# Patient Record
Sex: FEMALE | Race: ASIAN | NOT HISPANIC OR LATINO | URBAN - METROPOLITAN AREA
[De-identification: names, ages, dates, MRNs, and addresses within clinical notes are randomized per-mention and may not be internally consistent; named-entity substitution may affect disease eponyms.]

---

## 2022-09-11 ENCOUNTER — INPATIENT (INPATIENT)
Facility: HOSPITAL | Age: 37
LOS: 3 days | Discharge: HOME | End: 2022-09-15
Attending: OBSTETRICS & GYNECOLOGY | Admitting: OBSTETRICS & GYNECOLOGY
Payer: MEDICAID

## 2022-09-11 VITALS — HEIGHT: 59 IN | WEIGHT: 125 LBS

## 2022-09-11 DIAGNOSIS — O26.899 OTHER SPECIFIED PREGNANCY RELATED CONDITIONS, UNSPECIFIED TRIMESTER: ICD-10-CM

## 2022-09-11 DIAGNOSIS — Z34.80 ENCOUNTER FOR SUPERVISION OF OTHER NORMAL PREGNANCY, UNSPECIFIED TRIMESTER: ICD-10-CM

## 2022-09-11 DIAGNOSIS — Z3A.00 WEEKS OF GESTATION OF PREGNANCY NOT SPECIFIED: ICD-10-CM

## 2022-09-11 LAB
APTT BLD: 29.7 SEC — SIGNIFICANT CHANGE UP (ref 27.5–35.5)
BASOPHILS # BLD AUTO: 0.03 K/UL — SIGNIFICANT CHANGE UP (ref 0–0.2)
BASOPHILS NFR BLD AUTO: 0.4 % — SIGNIFICANT CHANGE UP (ref 0–2)
BLD GP AB SCN SERPL QL: SIGNIFICANT CHANGE UP
EOSINOPHIL # BLD AUTO: 0.1 K/UL — SIGNIFICANT CHANGE UP (ref 0–0.5)
EOSINOPHIL NFR BLD AUTO: 1.4 % — SIGNIFICANT CHANGE UP (ref 0–6)
FIBRINOGEN PPP-MCNC: 926 MG/DL — HIGH (ref 340–550)
GLUCOSE BLDC GLUCOMTR-MCNC: 130 MG/DL — HIGH (ref 70–99)
GLUCOSE BLDC GLUCOMTR-MCNC: 140 MG/DL — HIGH (ref 70–99)
GLUCOSE BLDC GLUCOMTR-MCNC: 99 MG/DL — SIGNIFICANT CHANGE UP (ref 70–99)
HCT VFR BLD CALC: 39.5 % — SIGNIFICANT CHANGE UP (ref 34.5–45)
HGB BLD-MCNC: 12.8 G/DL — SIGNIFICANT CHANGE UP (ref 11.5–15.5)
IMM GRANULOCYTES NFR BLD AUTO: 0.7 % — SIGNIFICANT CHANGE UP (ref 0–1.5)
INR BLD: 0.95 RATIO — SIGNIFICANT CHANGE UP (ref 0.88–1.16)
LYMPHOCYTES # BLD AUTO: 1.47 K/UL — SIGNIFICANT CHANGE UP (ref 1–3.3)
LYMPHOCYTES # BLD AUTO: 20.4 % — SIGNIFICANT CHANGE UP (ref 13–44)
MCHC RBC-ENTMCNC: 27.2 PG — SIGNIFICANT CHANGE UP (ref 27–34)
MCHC RBC-ENTMCNC: 32.4 GM/DL — SIGNIFICANT CHANGE UP (ref 32–36)
MCV RBC AUTO: 84 FL — SIGNIFICANT CHANGE UP (ref 80–100)
MONOCYTES # BLD AUTO: 0.48 K/UL — SIGNIFICANT CHANGE UP (ref 0–0.9)
MONOCYTES NFR BLD AUTO: 6.6 % — SIGNIFICANT CHANGE UP (ref 2–14)
NEUTROPHILS # BLD AUTO: 5.09 K/UL — SIGNIFICANT CHANGE UP (ref 1.8–7.4)
NEUTROPHILS NFR BLD AUTO: 70.5 % — SIGNIFICANT CHANGE UP (ref 43–77)
NRBC # BLD: 0 /100 WBCS — SIGNIFICANT CHANGE UP (ref 0–0)
PLATELET # BLD AUTO: 201 K/UL — SIGNIFICANT CHANGE UP (ref 150–400)
PROTHROM AB SERPL-ACNC: 11.3 SEC — SIGNIFICANT CHANGE UP (ref 10.5–13.4)
RBC # BLD: 4.7 M/UL — SIGNIFICANT CHANGE UP (ref 3.8–5.2)
RBC # FLD: 14.8 % — HIGH (ref 10.3–14.5)
SARS-COV-2 RNA SPEC QL NAA+PROBE: SIGNIFICANT CHANGE UP
WBC # BLD: 7.22 K/UL — SIGNIFICANT CHANGE UP (ref 3.8–10.5)
WBC # FLD AUTO: 7.22 K/UL — SIGNIFICANT CHANGE UP (ref 3.8–10.5)

## 2022-09-11 RX ORDER — CHLORHEXIDINE GLUCONATE 213 G/1000ML
1 SOLUTION TOPICAL ONCE
Refills: 0 | Status: DISCONTINUED | OUTPATIENT
Start: 2022-09-11 | End: 2022-09-13

## 2022-09-11 RX ORDER — SODIUM CHLORIDE 9 MG/ML
1000 INJECTION INTRAMUSCULAR; INTRAVENOUS; SUBCUTANEOUS
Refills: 0 | Status: DISCONTINUED | OUTPATIENT
Start: 2022-09-11 | End: 2022-09-13

## 2022-09-11 RX ORDER — SODIUM CHLORIDE 9 MG/ML
1000 INJECTION, SOLUTION INTRAVENOUS
Refills: 0 | Status: DISCONTINUED | OUTPATIENT
Start: 2022-09-11 | End: 2022-09-13

## 2022-09-11 RX ORDER — CITRIC ACID/SODIUM CITRATE 300-500 MG
15 SOLUTION, ORAL ORAL EVERY 6 HOURS
Refills: 0 | Status: DISCONTINUED | OUTPATIENT
Start: 2022-09-11 | End: 2022-09-13

## 2022-09-11 RX ORDER — LEVOTHYROXINE SODIUM 125 MCG
50 TABLET ORAL DAILY
Refills: 0 | Status: DISCONTINUED | OUTPATIENT
Start: 2022-09-11 | End: 2022-09-15

## 2022-09-11 RX ADMIN — SODIUM CHLORIDE 125 MILLILITER(S): 9 INJECTION, SOLUTION INTRAVENOUS at 17:25

## 2022-09-11 RX ADMIN — SODIUM CHLORIDE 125 MILLILITER(S): 9 INJECTION INTRAMUSCULAR; INTRAVENOUS; SUBCUTANEOUS at 17:10

## 2022-09-11 RX ADMIN — Medication 50 MICROGRAM(S): at 12:44

## 2022-09-11 NOTE — PATIENT PROFILE OB - FALL HARM RISK - UNIVERSAL INTERVENTIONS
Bed in lowest position, wheels locked, appropriate side rails in place/Call bell, personal items and telephone in reach/Instruct patient to call for assistance before getting out of bed or chair/Non-slip footwear when patient is out of bed/[place] to call system/Physically safe environment - no spills, clutter or unnecessary equipment/Purposeful Proactive Rounding/Room/bathroom lighting operational, light cord in reach

## 2022-09-11 NOTE — PATIENT PROFILE OB - TELEPHONIC ID NUMBER OF THE INTERPRETER
Pt here with posterior L foot pain x 2 weeks. Denies injury or trauma. 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 Declines WC   [id number]

## 2022-09-12 LAB
ABO RH CONFIRMATION: SIGNIFICANT CHANGE UP
GLUCOSE BLDC GLUCOMTR-MCNC: 106 MG/DL — HIGH (ref 70–99)
GLUCOSE BLDC GLUCOMTR-MCNC: 114 MG/DL — HIGH (ref 70–99)
GLUCOSE BLDC GLUCOMTR-MCNC: 142 MG/DL — HIGH (ref 70–99)
GLUCOSE BLDC GLUCOMTR-MCNC: 162 MG/DL — HIGH (ref 70–99)
GLUCOSE BLDC GLUCOMTR-MCNC: 87 MG/DL — SIGNIFICANT CHANGE UP (ref 70–99)
T PALLIDUM AB TITR SER: NEGATIVE — SIGNIFICANT CHANGE UP

## 2022-09-12 RX ORDER — OXYTOCIN 10 UNIT/ML
2 VIAL (ML) INJECTION
Qty: 30 | Refills: 0 | Status: DISCONTINUED | OUTPATIENT
Start: 2022-09-12 | End: 2022-09-15

## 2022-09-12 RX ORDER — CEFAZOLIN SODIUM 1 G
1000 VIAL (EA) INJECTION ONCE
Refills: 0 | Status: COMPLETED | OUTPATIENT
Start: 2022-09-12 | End: 2022-09-12

## 2022-09-12 RX ORDER — AZITHROMYCIN 500 MG/1
500 TABLET, FILM COATED ORAL ONCE
Refills: 0 | Status: COMPLETED | OUTPATIENT
Start: 2022-09-12 | End: 2022-09-13

## 2022-09-12 RX ORDER — CITRIC ACID/SODIUM CITRATE 300-500 MG
30 SOLUTION, ORAL ORAL ONCE
Refills: 0 | Status: COMPLETED | OUTPATIENT
Start: 2022-09-12 | End: 2022-09-12

## 2022-09-12 RX ADMIN — SODIUM CHLORIDE 125 MILLILITER(S): 9 INJECTION, SOLUTION INTRAVENOUS at 02:00

## 2022-09-12 RX ADMIN — Medication 50 MICROGRAM(S): at 07:11

## 2022-09-12 RX ADMIN — Medication 30 MILLILITER(S): at 23:40

## 2022-09-12 RX ADMIN — Medication 100 MILLIGRAM(S): at 23:45

## 2022-09-12 RX ADMIN — Medication 2 MILLIUNIT(S)/MIN: at 15:44

## 2022-09-12 RX ADMIN — SODIUM CHLORIDE 125 MILLILITER(S): 9 INJECTION INTRAMUSCULAR; INTRAVENOUS; SUBCUTANEOUS at 11:28

## 2022-09-13 LAB
GLUCOSE BLDC GLUCOMTR-MCNC: 101 MG/DL — HIGH (ref 70–99)
GLUCOSE BLDC GLUCOMTR-MCNC: 105 MG/DL — HIGH (ref 70–99)
GLUCOSE BLDC GLUCOMTR-MCNC: 107 MG/DL — HIGH (ref 70–99)
GLUCOSE BLDC GLUCOMTR-MCNC: 112 MG/DL — HIGH (ref 70–99)

## 2022-09-13 PROCEDURE — 88307 TISSUE EXAM BY PATHOLOGIST: CPT | Mod: 26

## 2022-09-13 PROCEDURE — 88304 TISSUE EXAM BY PATHOLOGIST: CPT | Mod: 26

## 2022-09-13 DEVICE — SURGICEL FIBRILLAR 2 X 4": Type: IMPLANTABLE DEVICE | Status: FUNCTIONAL

## 2022-09-13 RX ORDER — FERROUS SULFATE 325(65) MG
325 TABLET ORAL DAILY
Refills: 0 | Status: DISCONTINUED | OUTPATIENT
Start: 2022-09-13 | End: 2022-09-15

## 2022-09-13 RX ORDER — IBUPROFEN 200 MG
600 TABLET ORAL EVERY 6 HOURS
Refills: 0 | Status: COMPLETED | OUTPATIENT
Start: 2022-09-13 | End: 2023-08-12

## 2022-09-13 RX ORDER — ACETAMINOPHEN 500 MG
975 TABLET ORAL EVERY 6 HOURS
Refills: 0 | Status: DISCONTINUED | OUTPATIENT
Start: 2022-09-13 | End: 2022-09-15

## 2022-09-13 RX ORDER — SODIUM CHLORIDE 9 MG/ML
1000 INJECTION, SOLUTION INTRAVENOUS
Refills: 0 | Status: DISCONTINUED | OUTPATIENT
Start: 2022-09-13 | End: 2022-09-15

## 2022-09-13 RX ORDER — IBUPROFEN 200 MG
600 TABLET ORAL EVERY 6 HOURS
Refills: 0 | Status: DISCONTINUED | OUTPATIENT
Start: 2022-09-13 | End: 2022-09-15

## 2022-09-13 RX ORDER — ACETAMINOPHEN 500 MG
1000 TABLET ORAL ONCE
Refills: 0 | Status: COMPLETED | OUTPATIENT
Start: 2022-09-13 | End: 2022-09-14

## 2022-09-13 RX ORDER — MAGNESIUM HYDROXIDE 400 MG/1
30 TABLET, CHEWABLE ORAL
Refills: 0 | Status: DISCONTINUED | OUTPATIENT
Start: 2022-09-13 | End: 2022-09-15

## 2022-09-13 RX ORDER — TETANUS TOXOID, REDUCED DIPHTHERIA TOXOID AND ACELLULAR PERTUSSIS VACCINE, ADSORBED 5; 2.5; 8; 8; 2.5 [IU]/.5ML; [IU]/.5ML; UG/.5ML; UG/.5ML; UG/.5ML
0.5 SUSPENSION INTRAMUSCULAR ONCE
Refills: 0 | Status: DISCONTINUED | OUTPATIENT
Start: 2022-09-13 | End: 2022-09-15

## 2022-09-13 RX ORDER — INSULIN LISPRO 100/ML
VIAL (ML) SUBCUTANEOUS
Refills: 0 | Status: DISCONTINUED | OUTPATIENT
Start: 2022-09-13 | End: 2022-09-15

## 2022-09-13 RX ORDER — KETOROLAC TROMETHAMINE 30 MG/ML
30 SYRINGE (ML) INJECTION EVERY 6 HOURS
Refills: 0 | Status: DISCONTINUED | OUTPATIENT
Start: 2022-09-13 | End: 2022-09-13

## 2022-09-13 RX ORDER — HEPARIN SODIUM 5000 [USP'U]/ML
5000 INJECTION INTRAVENOUS; SUBCUTANEOUS EVERY 12 HOURS
Refills: 0 | Status: DISCONTINUED | OUTPATIENT
Start: 2022-09-13 | End: 2022-09-15

## 2022-09-13 RX ORDER — FOLIC ACID 0.8 MG
1 TABLET ORAL DAILY
Refills: 0 | Status: DISCONTINUED | OUTPATIENT
Start: 2022-09-13 | End: 2022-09-15

## 2022-09-13 RX ORDER — DEXTROSE 50 % IN WATER 50 %
12.5 SYRINGE (ML) INTRAVENOUS ONCE
Refills: 0 | Status: DISCONTINUED | OUTPATIENT
Start: 2022-09-13 | End: 2022-09-15

## 2022-09-13 RX ORDER — SIMETHICONE 80 MG/1
80 TABLET, CHEWABLE ORAL EVERY 4 HOURS
Refills: 0 | Status: DISCONTINUED | OUTPATIENT
Start: 2022-09-13 | End: 2022-09-15

## 2022-09-13 RX ORDER — OXYCODONE HYDROCHLORIDE 5 MG/1
5 TABLET ORAL
Refills: 0 | Status: DISCONTINUED | OUTPATIENT
Start: 2022-09-13 | End: 2022-09-15

## 2022-09-13 RX ORDER — DEXTROSE 50 % IN WATER 50 %
25 SYRINGE (ML) INTRAVENOUS ONCE
Refills: 0 | Status: DISCONTINUED | OUTPATIENT
Start: 2022-09-13 | End: 2022-09-15

## 2022-09-13 RX ORDER — LANOLIN
1 OINTMENT (GRAM) TOPICAL EVERY 6 HOURS
Refills: 0 | Status: DISCONTINUED | OUTPATIENT
Start: 2022-09-13 | End: 2022-09-15

## 2022-09-13 RX ORDER — DEXTROSE 50 % IN WATER 50 %
15 SYRINGE (ML) INTRAVENOUS ONCE
Refills: 0 | Status: DISCONTINUED | OUTPATIENT
Start: 2022-09-13 | End: 2022-09-15

## 2022-09-13 RX ORDER — GLUCAGON INJECTION, SOLUTION 0.5 MG/.1ML
1 INJECTION, SOLUTION SUBCUTANEOUS ONCE
Refills: 0 | Status: DISCONTINUED | OUTPATIENT
Start: 2022-09-13 | End: 2022-09-15

## 2022-09-13 RX ORDER — ONDANSETRON 8 MG/1
4 TABLET, FILM COATED ORAL EVERY 4 HOURS
Refills: 0 | Status: COMPLETED | OUTPATIENT
Start: 2022-09-13 | End: 2022-09-13

## 2022-09-13 RX ORDER — DIPHENHYDRAMINE HCL 50 MG
25 CAPSULE ORAL EVERY 6 HOURS
Refills: 0 | Status: DISCONTINUED | OUTPATIENT
Start: 2022-09-13 | End: 2022-09-15

## 2022-09-13 RX ORDER — OXYTOCIN 10 UNIT/ML
333.33 VIAL (ML) INJECTION
Qty: 20 | Refills: 0 | Status: DISCONTINUED | OUTPATIENT
Start: 2022-09-13 | End: 2022-09-15

## 2022-09-13 RX ADMIN — Medication 50 MICROGRAM(S): at 06:09

## 2022-09-13 RX ADMIN — ONDANSETRON 4 MILLIGRAM(S): 8 TABLET, FILM COATED ORAL at 15:10

## 2022-09-13 RX ADMIN — Medication 30 MILLIGRAM(S): at 21:01

## 2022-09-13 RX ADMIN — Medication 30 MILLIGRAM(S): at 06:09

## 2022-09-13 RX ADMIN — Medication 1 MILLIGRAM(S): at 13:09

## 2022-09-13 RX ADMIN — Medication 325 MILLIGRAM(S): at 13:10

## 2022-09-13 RX ADMIN — Medication 30 MILLIGRAM(S): at 07:30

## 2022-09-13 RX ADMIN — Medication 30 MILLIGRAM(S): at 21:31

## 2022-09-13 RX ADMIN — AZITHROMYCIN 255 MILLIGRAM(S): 500 TABLET, FILM COATED ORAL at 00:01

## 2022-09-13 RX ADMIN — Medication 30 MILLIGRAM(S): at 14:00

## 2022-09-13 RX ADMIN — Medication 30 MILLIGRAM(S): at 13:08

## 2022-09-13 RX ADMIN — ONDANSETRON 4 MILLIGRAM(S): 8 TABLET, FILM COATED ORAL at 18:16

## 2022-09-13 RX ADMIN — Medication 1 TABLET(S): at 13:10

## 2022-09-13 RX ADMIN — ONDANSETRON 4 MILLIGRAM(S): 8 TABLET, FILM COATED ORAL at 10:21

## 2022-09-13 RX ADMIN — HEPARIN SODIUM 5000 UNIT(S): 5000 INJECTION INTRAVENOUS; SUBCUTANEOUS at 13:08

## 2022-09-13 NOTE — CHART NOTE - NSCHARTNOTEFT_GEN_A_CORE
Patient seen and evaluated at bedside, resting comfortably.  Reports nause/vomitting.   Voiding clear urine into mo. Denies fever, dizziness, chills, sob, chest pain, or any other concern.  at bedside. attempting to breast feed    Vital Signs Last 24 Hrs  T(C): 36.6 (13 Sep 2022 04:40), Max: 36.8 (13 Sep 2022 02:00)  T(F): 97.9 (13 Sep 2022 04:40), Max: 98.2 (13 Sep 2022 02:00)  HR: 98 (13 Sep 2022 04:40) (88 - 98)  BP: 97/60 (13 Sep 2022 04:40) (96/57 - 109/57)  BP(mean): 73 (13 Sep 2022 04:40) (73 - 73)  RR: 18 (13 Sep 2022 04:40) (17 - 18)  SpO2: 97% (13 Sep 2022 04:40) (97% - 100%)    exam  gen: nad, aa+o x 3  abd: soft, non tender,  fundus firm,  dressing in place, clean, dry and intact  gyn: minimal lochia  ext: venodynes in place    a/p 36 yo  F s/p primary  @ 39weeks cat 2 tracing remote from delivery, miol for pregestational diabetes, vitals signs stable  -- zofan for vomitting  --f/u 6pm cbc  --dc mo 12 hour post op  --pain management prn  -- incentive spirometer  -- Venodyne heparin for vte prophylaxis  -- continue post op care  Dr Ramos ( house attending) notified

## 2022-09-14 DIAGNOSIS — E11.9 TYPE 2 DIABETES MELLITUS WITHOUT COMPLICATIONS: ICD-10-CM

## 2022-09-14 DIAGNOSIS — Z98.891 HISTORY OF UTERINE SCAR FROM PREVIOUS SURGERY: ICD-10-CM

## 2022-09-14 DIAGNOSIS — D62 ACUTE POSTHEMORRHAGIC ANEMIA: ICD-10-CM

## 2022-09-14 LAB
A1C WITH ESTIMATED AVERAGE GLUCOSE RESULT: 5.7 % — HIGH (ref 4–5.6)
BASOPHILS # BLD AUTO: 0.04 K/UL — SIGNIFICANT CHANGE UP (ref 0–0.2)
BASOPHILS NFR BLD AUTO: 0.3 % — SIGNIFICANT CHANGE UP (ref 0–2)
EOSINOPHIL # BLD AUTO: 0.07 K/UL — SIGNIFICANT CHANGE UP (ref 0–0.5)
EOSINOPHIL NFR BLD AUTO: 0.5 % — SIGNIFICANT CHANGE UP (ref 0–6)
ESTIMATED AVERAGE GLUCOSE: 117 MG/DL — HIGH (ref 68–114)
GLUCOSE BLDC GLUCOMTR-MCNC: 111 MG/DL — HIGH (ref 70–99)
GLUCOSE BLDC GLUCOMTR-MCNC: 86 MG/DL — SIGNIFICANT CHANGE UP (ref 70–99)
GLUCOSE BLDC GLUCOMTR-MCNC: 89 MG/DL — SIGNIFICANT CHANGE UP (ref 70–99)
GLUCOSE BLDC GLUCOMTR-MCNC: 91 MG/DL — SIGNIFICANT CHANGE UP (ref 70–99)
GLUCOSE BLDC GLUCOMTR-MCNC: 93 MG/DL — SIGNIFICANT CHANGE UP (ref 70–99)
HCT VFR BLD CALC: 28.7 % — LOW (ref 34.5–45)
HGB BLD-MCNC: 9.1 G/DL — LOW (ref 11.5–15.5)
IMM GRANULOCYTES NFR BLD AUTO: 0.9 % — SIGNIFICANT CHANGE UP (ref 0–1.5)
LYMPHOCYTES # BLD AUTO: 1.82 K/UL — SIGNIFICANT CHANGE UP (ref 1–3.3)
LYMPHOCYTES # BLD AUTO: 13.5 % — SIGNIFICANT CHANGE UP (ref 13–44)
MCHC RBC-ENTMCNC: 27.7 PG — SIGNIFICANT CHANGE UP (ref 27–34)
MCHC RBC-ENTMCNC: 31.7 GM/DL — LOW (ref 32–36)
MCV RBC AUTO: 87.2 FL — SIGNIFICANT CHANGE UP (ref 80–100)
MONOCYTES # BLD AUTO: 0.67 K/UL — SIGNIFICANT CHANGE UP (ref 0–0.9)
MONOCYTES NFR BLD AUTO: 5 % — SIGNIFICANT CHANGE UP (ref 2–14)
NEUTROPHILS # BLD AUTO: 10.81 K/UL — HIGH (ref 1.8–7.4)
NEUTROPHILS NFR BLD AUTO: 79.8 % — HIGH (ref 43–77)
NRBC # BLD: 0 /100 WBCS — SIGNIFICANT CHANGE UP (ref 0–0)
PLATELET # BLD AUTO: 174 K/UL — SIGNIFICANT CHANGE UP (ref 150–400)
RBC # BLD: 3.29 M/UL — LOW (ref 3.8–5.2)
RBC # FLD: 15.1 % — HIGH (ref 10.3–14.5)
WBC # BLD: 13.53 K/UL — HIGH (ref 3.8–10.5)
WBC # FLD AUTO: 13.53 K/UL — HIGH (ref 3.8–10.5)

## 2022-09-14 RX ORDER — ASCORBIC ACID 60 MG
500 TABLET,CHEWABLE ORAL DAILY
Refills: 0 | Status: DISCONTINUED | OUTPATIENT
Start: 2022-09-14 | End: 2022-09-15

## 2022-09-14 RX ORDER — SODIUM CHLORIDE 9 MG/ML
1000 INJECTION, SOLUTION INTRAVENOUS ONCE
Refills: 0 | Status: DISCONTINUED | OUTPATIENT
Start: 2022-09-14 | End: 2022-09-15

## 2022-09-14 RX ORDER — SODIUM CHLORIDE 9 MG/ML
1000 INJECTION, SOLUTION INTRAVENOUS
Refills: 0 | Status: DISCONTINUED | OUTPATIENT
Start: 2022-09-14 | End: 2022-09-15

## 2022-09-14 RX ADMIN — Medication 1 CAPSULE(S): at 15:49

## 2022-09-14 RX ADMIN — Medication 1000 MILLIGRAM(S): at 01:18

## 2022-09-14 RX ADMIN — Medication 975 MILLIGRAM(S): at 10:00

## 2022-09-14 RX ADMIN — Medication 1 CAPSULE(S): at 15:02

## 2022-09-14 RX ADMIN — Medication 600 MILLIGRAM(S): at 05:55

## 2022-09-14 RX ADMIN — HEPARIN SODIUM 5000 UNIT(S): 5000 INJECTION INTRAVENOUS; SUBCUTANEOUS at 15:04

## 2022-09-14 RX ADMIN — Medication 500 MILLIGRAM(S): at 15:03

## 2022-09-14 RX ADMIN — Medication 325 MILLIGRAM(S): at 15:02

## 2022-09-14 RX ADMIN — Medication 1 TABLET(S): at 15:03

## 2022-09-14 RX ADMIN — Medication 600 MILLIGRAM(S): at 06:25

## 2022-09-14 RX ADMIN — SIMETHICONE 80 MILLIGRAM(S): 80 TABLET, CHEWABLE ORAL at 09:38

## 2022-09-14 RX ADMIN — Medication 1 MILLIGRAM(S): at 15:03

## 2022-09-14 RX ADMIN — HEPARIN SODIUM 5000 UNIT(S): 5000 INJECTION INTRAVENOUS; SUBCUTANEOUS at 00:48

## 2022-09-14 RX ADMIN — Medication 50 MICROGRAM(S): at 05:56

## 2022-09-14 RX ADMIN — SIMETHICONE 80 MILLIGRAM(S): 80 TABLET, CHEWABLE ORAL at 15:03

## 2022-09-14 RX ADMIN — MAGNESIUM HYDROXIDE 30 MILLILITER(S): 400 TABLET, CHEWABLE ORAL at 09:38

## 2022-09-14 RX ADMIN — Medication 975 MILLIGRAM(S): at 09:38

## 2022-09-14 RX ADMIN — Medication 400 MILLIGRAM(S): at 00:48

## 2022-09-14 NOTE — DISCHARGE NOTE OB - PATIENT PORTAL LINK FT
You can access the FollowMyHealth Patient Portal offered by [facility] by registering at the following website: [email or web address]. By joining [facility] FollowMyHealth portal, you will also be able to view your health information using other applications (apps) compatible with our system.

## 2022-09-14 NOTE — DISCHARGE NOTE OB - CARE PLAN
Principal Discharge DX:	Status post primary low transverse  section  Assessment and plan of treatment:	Continue breastfeeding.  Motrin as needed for pain.  Ambulate daily.  No heavy lifting or anything per vagina x 6 weeks - no sex, tampons, douching, tub baths, etc.  Follow up in office in 1-2 weeks for incision check, and then at 6 weeks for postpartum check.  Secondary Diagnosis:	Acute blood loss anemia  Assessment and plan of treatment:	take iron, folic acid, vitamin C, and prenatal vitamins. eat iron fortified food. Please take iron tablets three times daily. Return if any dizziness, shortness of breath, palpitations, chest pain or any other acute symptoms. Please have caution when holding baby to prevent any falls or dizziness with baby in arms.  Secondary Diagnosis:	Diabetes mellitus  Assessment and plan of treatment:	Please continue to monitor and record fingersticks 4x per day  Please follow up with your OB for postpartum glucose test   1 Principal Discharge DX:	Status post primary low transverse  section  Assessment and plan of treatment:	Continue breastfeeding.  Motrin as needed for pain.  Ambulate daily.  No heavy lifting or anything per vagina x 6 weeks - no sex, tampons, douching, tub baths, etc.  Follow up in office in 1-2 weeks for incision check, and then at 6 weeks for postpartum check.  Secondary Diagnosis:	Acute blood loss anemia  Assessment and plan of treatment:	take iron, folic acid, vitamin C, and prenatal vitamins. eat iron fortified food. Please take iron tablets three times daily. Return if any dizziness, shortness of breath, palpitations, chest pain or any other acute symptoms. Please have caution when holding baby to prevent any falls or dizziness with baby in arms.  Secondary Diagnosis:	Diabetes mellitus  Assessment and plan of treatment:	Please continue to monitor and record fingersticks 4x per day  Please follow up with your OB for postpartum glucose test  Secondary Diagnosis:	Spinal headache  Assessment and plan of treatment:	patient was counseled by anesthesia and OB attending, patient refused blood patch, wants conservative management   Principal Discharge DX:	Status post primary low transverse  section  Assessment and plan of treatment:	Continue breastfeeding.  Motrin as needed for pain.  Ambulate daily.  No heavy lifting or anything per vagina x 6 weeks - no sex, tampons, douching, tub baths, etc.  Follow up in office in 1-2 weeks for incision check, and then at 6 weeks for postpartum check.  Secondary Diagnosis:	Acute blood loss anemia  Assessment and plan of treatment:	take iron, folic acid, vitamin C, and prenatal vitamins. eat iron fortified food. Please take iron tablets three times daily. Return if any dizziness, shortness of breath, palpitations, chest pain or any other acute symptoms. Please have caution when holding baby to prevent any falls or dizziness with baby in arms.  Secondary Diagnosis:	Diabetes mellitus  Assessment and plan of treatment:	Please continue to monitor and record fingersticks 4x per day  Please follow up with your OB for postpartum glucose test  Secondary Diagnosis:	Spinal headache  Assessment and plan of treatment:	patient was counseled by anesthesia and OB attending, patient refused blood patch, wants conservative management, will resolve in 2 weeks

## 2022-09-14 NOTE — CHART NOTE - NSCHARTNOTEFT_GEN_A_CORE
pt c/o neck pain, reports early this morning she had a frontal headache that has now resolved. pt just given tylenol   denies visual changes, chest pain, shortness of breath, abdominal pain, weakness, lower extremity pain/swelling or any other complaintss    A/P: POD #1 s/p primary c/s @ 39wks for category II tracing remote from delivery, pre-gestational dm not requiring insulin, hypothyroidism, acute blood loss anemia, pt stable  - follow up pain relief w acetaminophen  - abdominal binder  - ivf hydration   - iron, vitC, pnv   - synthroid  - fingersticks, ISS  -Pain management as needed  -DVT ppx: OOB and ambulate, heparin   -f/u Rpt CBC   -Advance diet to regular  -Encourage breastfeeding and incentive spirometer   -d/w dr. pinto pt c/o neck pain, reports early this morning she had a frontal headache that has now resolved. pt just given tylenol   c/o neck pain   denies visual changes, chest pain, shortness of breath, abdominal pain, weakness, lower extremity pain/swelling or any other complaintss    HEENT: atraumatic, normocephalic  cervical paraspinal tenderness noted  no c-spine tenderness   no focal defecit  ext: no calf tenderness, no edema, strength 5+ b/l motor/sensation intact     A/P: POD #1 s/p primary c/s @ 39wks for category II tracing remote from delivery, pre-gestational dm not requiring insulin, hypothyroidism, acute blood loss anemia, pt stable  - anesthesia consulted to r/o spinal headache   - follow up pain relief w acetaminophen  - abdominal binder  - ivf hydration   - iron, vitC, pnv   - synthroid  - fingersticks, ISS  -Pain management as needed  -DVT ppx: OOB and ambulate, heparin   -f/u Rpt CBC   -Advance diet to regular  -Encourage breastfeeding and incentive spirometer   -d/w dr. pinto

## 2022-09-14 NOTE — DISCHARGE NOTE OB - MEDICATION SUMMARY - MEDICATIONS TO TAKE
I will START or STAY ON the medications listed below when I get home from the hospital:    Fioricet oral capsule  -- 1 cap(s) by mouth every 4 hours   -- Do not drink alcoholic beverages when taking this medication.  This drug may impair the ability to drive or operate machinery.  Use care until you become familiar with its effects.  This product contains acetaminophen.  Do not use  with any other product containing acetaminophen to prevent possible liver damage.    -- Indication: For Spinal headache    ibuprofen 600 mg oral tablet  -- 1 tab(s) by mouth every 6 hours   -- Do not take this drug if you are pregnant.  It is very important that you take or use this exactly as directed.  Do not skip doses or discontinue unless directed by your doctor.  May cause drowsiness or dizziness.  Obtain medical advice before taking any non-prescription drugs as some may affect the action of this medication.  Take with food or milk.    -- Indication: For Status post primary low transverse  section    ferrous sulfate 325 mg (65 mg elemental iron) oral tablet  -- 1 tab(s) by mouth 2 times a day   -- Check with your doctor before becoming pregnant.  Do not chew, break, or crush.  May discolor urine or feces.    -- Indication: For Acute blood loss anemia    Prenatal Multivitamins with Folic Acid 1 mg oral tablet  -- 1 tab(s) by mouth once a day  -- Indication: For Status post primary low transverse  section    Colace 2-in-1 50 mg-8.6 mg oral tablet  -- 2 tab(s) by mouth once a day (at bedtime)   -- Medication should be taken with plenty of water.    -- Indication: For constipation    Gas-X Ultra Softgels 180 mg oral capsule  -- 1 cap(s) by mouth 2 times a day   -- Indication: For gas pain    Vitamin C 500 mg oral tablet  -- 1 tab(s) by mouth once a day   -- Indication: For vitamins

## 2022-09-14 NOTE — DISCHARGE NOTE OB - CARE PROVIDER_API CALL
Eleanor Deleon  OBSTETRICS AND GYNECOLOGY  [address] [place] [address]  Phone: [phone]  Fax: [phone]  Follow Up Time: 2 weeks

## 2022-09-14 NOTE — CHART NOTE - NSCHARTNOTEFT_GEN_A_CORE
discussed with anesthesia pt likely has mild atypical spinal headache and desires conservative management with oral and iv hydration and around the clock Fioricet     A/P: POD #1 s/p primary c/s @ 39wks for category II tracing remote from delivery, pre-gestational dm not requiring insulin, hypothyroidism, acute blood loss anemia, mild spinal headache, pt stable  - anesthesia consulted to r/o spinal headache   - fioricet atc   - abdominal binder  - ivf hydration   - iron, vitC, pnv   - synthroid  - fingersticks, ISS  -Pain management as needed  -DVT ppx: OOB and ambulate, heparin   -f/u Rpt CBC   -Advance diet to regular  -Encourage breastfeeding and incentive spirometer   -d/w dr. pinto.

## 2022-09-14 NOTE — DISCHARGE NOTE OB - PLAN OF CARE
Please continue to monitor and record fingersticks 4x per day  Please follow up with your OB for postpartum glucose test Continue breastfeeding.  Motrin as needed for pain.  Ambulate daily.  No heavy lifting or anything per vagina x 6 weeks - no sex, tampons, douching, tub baths, etc.  Follow up in office in 1-2 weeks for incision check, and then at 6 weeks for postpartum check. take iron, folic acid, vitamin C, and prenatal vitamins. eat iron fortified food. Please take iron tablets three times daily. Return if any dizziness, shortness of breath, palpitations, chest pain or any other acute symptoms. Please have caution when holding baby to prevent any falls or dizziness with baby in arms. patient was counseled by anesthesia and OB attending, patient refused blood patch, wants conservative management patient was counseled by anesthesia and OB attending, patient refused blood patch, wants conservative management, will resolve in 2 weeks

## 2022-09-14 NOTE — DISCHARGE NOTE OB - NS MD DC FALL RISK RISK
For information on Fall & Injury Prevention, visit: [email or web address] OR  [email or web address] OR  https://www.cdc.gov/steadi/patient.html

## 2022-09-14 NOTE — DISCHARGE NOTE OB - HOSPITAL COURSE
38yo admitted for miol for pregestational dm, ama, s/p primary c/s for category II tracing remote from delivery, acute blood loss anemia, uncomplicated delivery and postpartum care, pt stable  36yo admitted for miol for pregestational dm, ama, s/p primary c/s for category II tracing remote from delivery, acute blood loss anemia,  PO iron given  c/b spinal headache refused blood patch   pt stable

## 2022-09-15 VITALS
SYSTOLIC BLOOD PRESSURE: 100 MMHG | DIASTOLIC BLOOD PRESSURE: 64 MMHG | HEART RATE: 87 BPM | RESPIRATION RATE: 18 BRPM | OXYGEN SATURATION: 99 % | TEMPERATURE: 98 F

## 2022-09-15 DIAGNOSIS — G97.1 OTHER REACTION TO SPINAL AND LUMBAR PUNCTURE: ICD-10-CM

## 2022-09-15 LAB
BASOPHILS # BLD AUTO: 0.04 K/UL — SIGNIFICANT CHANGE UP (ref 0–0.2)
BASOPHILS NFR BLD AUTO: 0.3 % — SIGNIFICANT CHANGE UP (ref 0–2)
EOSINOPHIL # BLD AUTO: 0.11 K/UL — SIGNIFICANT CHANGE UP (ref 0–0.5)
EOSINOPHIL NFR BLD AUTO: 0.8 % — SIGNIFICANT CHANGE UP (ref 0–6)
GLUCOSE BLDC GLUCOMTR-MCNC: 110 MG/DL — HIGH (ref 70–99)
GLUCOSE BLDC GLUCOMTR-MCNC: 158 MG/DL — HIGH (ref 70–99)
HCT VFR BLD CALC: 29.3 % — LOW (ref 34.5–45)
HGB BLD-MCNC: 9.4 G/DL — LOW (ref 11.5–15.5)
IMM GRANULOCYTES NFR BLD AUTO: 1.2 % — SIGNIFICANT CHANGE UP (ref 0–1.5)
LYMPHOCYTES # BLD AUTO: 16.6 % — SIGNIFICANT CHANGE UP (ref 13–44)
LYMPHOCYTES # BLD AUTO: 2.2 K/UL — SIGNIFICANT CHANGE UP (ref 1–3.3)
MCHC RBC-ENTMCNC: 27.6 PG — SIGNIFICANT CHANGE UP (ref 27–34)
MCHC RBC-ENTMCNC: 32.1 GM/DL — SIGNIFICANT CHANGE UP (ref 32–36)
MCV RBC AUTO: 86.2 FL — SIGNIFICANT CHANGE UP (ref 80–100)
MONOCYTES # BLD AUTO: 0.67 K/UL — SIGNIFICANT CHANGE UP (ref 0–0.9)
MONOCYTES NFR BLD AUTO: 5.1 % — SIGNIFICANT CHANGE UP (ref 2–14)
NEUTROPHILS # BLD AUTO: 10.08 K/UL — HIGH (ref 1.8–7.4)
NEUTROPHILS NFR BLD AUTO: 76 % — SIGNIFICANT CHANGE UP (ref 43–77)
NRBC # BLD: 0 /100 WBCS — SIGNIFICANT CHANGE UP (ref 0–0)
PLATELET # BLD AUTO: 203 K/UL — SIGNIFICANT CHANGE UP (ref 150–400)
RBC # BLD: 3.4 M/UL — LOW (ref 3.8–5.2)
RBC # FLD: 15.3 % — HIGH (ref 10.3–14.5)
WBC # BLD: 13.26 K/UL — HIGH (ref 3.8–10.5)
WBC # FLD AUTO: 13.26 K/UL — HIGH (ref 3.8–10.5)

## 2022-09-15 PROCEDURE — 85384 FIBRINOGEN ACTIVITY: CPT

## 2022-09-15 PROCEDURE — 88304 TISSUE EXAM BY PATHOLOGIST: CPT

## 2022-09-15 PROCEDURE — 82962 GLUCOSE BLOOD TEST: CPT

## 2022-09-15 PROCEDURE — 59050 FETAL MONITOR W/REPORT: CPT

## 2022-09-15 PROCEDURE — 86923 COMPATIBILITY TEST ELECTRIC: CPT

## 2022-09-15 PROCEDURE — 83036 HEMOGLOBIN GLYCOSYLATED A1C: CPT

## 2022-09-15 PROCEDURE — 88307 TISSUE EXAM BY PATHOLOGIST: CPT

## 2022-09-15 PROCEDURE — 86901 BLOOD TYPING SEROLOGIC RH(D): CPT

## 2022-09-15 PROCEDURE — 36415 COLL VENOUS BLD VENIPUNCTURE: CPT

## 2022-09-15 PROCEDURE — 86850 RBC ANTIBODY SCREEN: CPT

## 2022-09-15 PROCEDURE — 85025 COMPLETE CBC W/AUTO DIFF WBC: CPT

## 2022-09-15 PROCEDURE — 85730 THROMBOPLASTIN TIME PARTIAL: CPT

## 2022-09-15 PROCEDURE — G0463: CPT

## 2022-09-15 PROCEDURE — 87635 SARS-COV-2 COVID-19 AMP PRB: CPT

## 2022-09-15 PROCEDURE — 86900 BLOOD TYPING SEROLOGIC ABO: CPT

## 2022-09-15 PROCEDURE — C1889: CPT

## 2022-09-15 PROCEDURE — 85610 PROTHROMBIN TIME: CPT

## 2022-09-15 PROCEDURE — 59025 FETAL NON-STRESS TEST: CPT

## 2022-09-15 PROCEDURE — 86780 TREPONEMA PALLIDUM: CPT

## 2022-09-15 RX ORDER — SENNOSIDES/DOCUSATE SODIUM 8.6MG-50MG
2 TABLET ORAL
Qty: 60 | Refills: 0
Start: 2022-09-15 | End: 2022-10-14

## 2022-09-15 RX ORDER — ASCORBIC ACID 60 MG
1 TABLET,CHEWABLE ORAL
Qty: 30 | Refills: 0
Start: 2022-09-15 | End: 2022-10-14

## 2022-09-15 RX ORDER — IBUPROFEN 200 MG
1 TABLET ORAL
Qty: 120 | Refills: 0
Start: 2022-09-15 | End: 2022-10-14

## 2022-09-15 RX ORDER — FERROUS SULFATE 325(65) MG
1 TABLET ORAL
Qty: 60 | Refills: 0
Start: 2022-09-15 | End: 2022-10-14

## 2022-09-15 RX ORDER — SIMETHICONE 80 MG/1
1 TABLET, CHEWABLE ORAL
Qty: 20 | Refills: 0
Start: 2022-09-15 | End: 2022-09-24

## 2022-09-15 RX ADMIN — Medication 1 TABLET(S): at 13:07

## 2022-09-15 RX ADMIN — Medication 600 MILLIGRAM(S): at 13:30

## 2022-09-15 RX ADMIN — Medication 1 CAPSULE(S): at 03:16

## 2022-09-15 RX ADMIN — Medication 500 MILLIGRAM(S): at 18:16

## 2022-09-15 RX ADMIN — Medication 1 CAPSULE(S): at 09:16

## 2022-09-15 RX ADMIN — Medication 1 CAPSULE(S): at 09:05

## 2022-09-15 RX ADMIN — Medication 600 MILLIGRAM(S): at 13:07

## 2022-09-15 RX ADMIN — HEPARIN SODIUM 5000 UNIT(S): 5000 INJECTION INTRAVENOUS; SUBCUTANEOUS at 03:17

## 2022-09-15 RX ADMIN — Medication 1 MILLIGRAM(S): at 13:07

## 2022-09-15 RX ADMIN — Medication 1 CAPSULE(S): at 03:50

## 2022-09-15 RX ADMIN — SIMETHICONE 80 MILLIGRAM(S): 80 TABLET, CHEWABLE ORAL at 13:07

## 2022-09-15 RX ADMIN — SIMETHICONE 80 MILLIGRAM(S): 80 TABLET, CHEWABLE ORAL at 09:05

## 2022-09-15 RX ADMIN — MAGNESIUM HYDROXIDE 30 MILLILITER(S): 400 TABLET, CHEWABLE ORAL at 13:06

## 2022-09-15 RX ADMIN — Medication 325 MILLIGRAM(S): at 13:07

## 2022-09-15 RX ADMIN — Medication 50 MICROGRAM(S): at 07:22

## 2022-09-15 NOTE — PROGRESS NOTE ADULT - ASSESSMENT
A/P: POD#3 s/p primary c/s 39 weeks cat 2 c/b acute blood loss anemia and spinal headache, stable      -patient refused blood patch also initially refusing Fioricet now agreed to take it  -possible d/c home today with iron, vitamin C and fioricet  -patient was encouraged to walk to pass more flatus  - patient was educated on breast engorgment and mastitis, patient will pump, informed nurse to further educate patient   -instructions verbalized by Dr. Sung and Dr. Sung answered all patients questions  -follow up in 2 weeks in office for wound check and in 6 weeks for postpartum visit
A/P: POD #1 s/p primary c/s @ 39wks for category II tracing remote from delivery, pre-gestational dm not requiring insulin, hypothyroidism, acute blood loss anemia, pt stable  - iron, vitC, pnv   - synthroid  - fingersticks, ISS  -Pain management as needed  -DVT ppx: OOB and ambulate, heparin   -f/u Rpt CBC   -Advance diet to regular  -Encourage breastfeeding and incentive spirometer   -d/w dr. pinto

## 2022-09-15 NOTE — PROGRESS NOTE ADULT - PROBLEM SELECTOR PLAN 1
A/P: POD#3 s/p primary c/s 39 weeks cat 2 c/b acute blood loss anemia and spinal headache, stable      -patient refused blood patch also initially refusing Fioricet now agreed to take it  -possible d/c home today with iron, vitamin C and fioricet  -patient was encouraged to walk to pass more flatus  - patient was educated on breast engorgment and mastitis, patient will pump, informed nurse to further educate patient   -instructions verbalized by Dr. Sung and Dr. Sung answered all patients questions  -follow up in 2 weeks in office for wound check and in 6 weeks for postpartum visit

## 2022-09-15 NOTE — PROGRESS NOTE ADULT - SUBJECTIVE AND OBJECTIVE BOX
OB PA Progress Note POD#2    Patient seen at bedside with Dr. Ana Lilia mitchell attending c/o neck pain and headache when she stands up. Patient was seen by anesthesia yesterday and reviewed blood patch. Dr. Sung explained dx to patient and the treatment options. Ambulating to rest room, + void without difficulty, + flatus; - bm;  tolerating regular diet. both breastfeeding and bottle feeding. Denies HA, CP, SOB, N/V/D,  dizziness, palpitations, worsening abdominal pain, worsening vaginal bleeding, or any other concerns.     Vital Signs Last 24 Hrs  T(C): 36.7 (15 Sep 2022 06:27), Max: 36.7 (15 Sep 2022 06:27)  T(F): 98 (15 Sep 2022 06:27), Max: 98 (15 Sep 2022 06:27)  HR: 87 (15 Sep 2022 06:27) (73 - 87)  BP: 100/64 (15 Sep 2022 06:27) (100/64 - 102/67)  BP(mean): --  RR: 18 (15 Sep 2022 06:27) (18 - 18)  SpO2: 99% (15 Sep 2022 06:27) (99% - 99%)    Parameters below as of 15 Sep 2022 06:27  Patient On (Oxygen Delivery Method): room air        Gen: A&O x 3, NAD  Chest: CTA B/L  Cardiac: S1,S2  RRR  Breast: Soft, nontender, b/l engorgement   Abdomen: +BS; soft; Nontender, mild distention, fundus firm, Incision C/D/I steri strips in place   Gyn: Minimal lochia  Extremities: Nontender, no worsening edema                          9.4    13.26 )-----------( 203      ( 15 Sep 2022 07:11 )             29.3          
Patient seen at bedside resting comfortably offers no new complaints. not yet ambulating, mo just removed + voiding spontaneously.  + flatus;  no bowel movement; tolerating clear liquid diet.  Pt both breast and bottle feeding. Pt denies headache, weakness, chest pain, shortness of breath, blurry vision or epigastric pain, N/V/D,  palpitations, worsening vaginal bleeding.    Vital Signs Last 24 Hrs  T(C): 36.4 (14 Sep 2022 06:12), Max: 36.8 (13 Sep 2022 18:10)  T(F): 97.5 (14 Sep 2022 06:12), Max: 98.3 (13 Sep 2022 18:12)  HR: 86 (14 Sep 2022 06:12) (76 - 90)  BP: 99/60 (14 Sep 2022 06:12) (90/51 - 99/62)  BP(mean): 72 (13 Sep 2022 21:00) (72 - 72)  RR: 17 (14 Sep 2022 06:12) (17 - 18)  SpO2: 99% (14 Sep 2022 06:12) (97% - 99%)    Parameters below as of 14 Sep 2022 06:12  Patient On (Oxygen Delivery Method): room air        Gen: A&O x 3, NAD  Chest: CTA B/L  Cardiac: S1,S2  RRR  Breast: Soft, nontender, nonengorged  Abdomen: +BS; soft; Nontender, nondistended; dressing removed incision C/D/I steri strips in place  Gyn: minimal lochia   Extremities: Nontender, venodynes in place, no calf tenderness                          9.1    13.53 )-----------( 174      ( 14 Sep 2022 05:46 )             28.7       A/P: POD #1 s/p primary c/s @ 39wks for category II tracing remote from delivery, pre-gestational dm not requiring insulin, hypothyroidism, acute blood loss anemia, pt stable  - iron, vitC, pnv   - synthroid  - fingersticks, ISS  -Pain management as needed  -DVT ppx: OOB and ambulate, heparin   -f/u Rpt CBC   -Advance diet to regular  -Encourage breastfeeding and incentive spirometer   -d/w dr. pinto

## 2022-09-20 LAB — SURGICAL PATHOLOGY STUDY: SIGNIFICANT CHANGE UP

## 2022-10-04 ENCOUNTER — INPATIENT (INPATIENT)
Facility: HOSPITAL | Age: 37
LOS: 7 days | Discharge: HOME HEALTH | DRG: 776 | End: 2022-10-12
Attending: OBSTETRICS & GYNECOLOGY | Admitting: OBSTETRICS & GYNECOLOGY
Payer: MEDICAID

## 2022-10-04 VITALS
WEIGHT: 107.81 LBS | RESPIRATION RATE: 17 BRPM | OXYGEN SATURATION: 99 % | SYSTOLIC BLOOD PRESSURE: 109 MMHG | TEMPERATURE: 98 F | DIASTOLIC BLOOD PRESSURE: 76 MMHG | HEART RATE: 132 BPM | HEIGHT: 59 IN

## 2022-10-04 LAB
ALBUMIN SERPL ELPH-MCNC: 2.8 G/DL — LOW (ref 3.5–5)
ALP SERPL-CCNC: 113 U/L — SIGNIFICANT CHANGE UP (ref 40–120)
ALT FLD-CCNC: 21 U/L DA — SIGNIFICANT CHANGE UP (ref 10–60)
ANION GAP SERPL CALC-SCNC: 12 MMOL/L — SIGNIFICANT CHANGE UP (ref 5–17)
APPEARANCE UR: ABNORMAL
APTT BLD: 31.7 SEC — SIGNIFICANT CHANGE UP (ref 27.5–35.5)
AST SERPL-CCNC: 14 U/L — SIGNIFICANT CHANGE UP (ref 10–40)
BACTERIA # UR AUTO: ABNORMAL /HPF
BASOPHILS # BLD AUTO: 0.02 K/UL — SIGNIFICANT CHANGE UP (ref 0–0.2)
BASOPHILS NFR BLD AUTO: 0.2 % — SIGNIFICANT CHANGE UP (ref 0–2)
BILIRUB SERPL-MCNC: 0.2 MG/DL — SIGNIFICANT CHANGE UP (ref 0.2–1.2)
BILIRUB UR-MCNC: NEGATIVE — SIGNIFICANT CHANGE UP
BUN SERPL-MCNC: 10 MG/DL — SIGNIFICANT CHANGE UP (ref 7–18)
CALCIUM SERPL-MCNC: 9 MG/DL — SIGNIFICANT CHANGE UP (ref 8.4–10.5)
CHLORIDE SERPL-SCNC: 104 MMOL/L — SIGNIFICANT CHANGE UP (ref 96–108)
CO2 SERPL-SCNC: 22 MMOL/L — SIGNIFICANT CHANGE UP (ref 22–31)
COLOR SPEC: YELLOW — SIGNIFICANT CHANGE UP
CREAT SERPL-MCNC: 0.53 MG/DL — SIGNIFICANT CHANGE UP (ref 0.5–1.3)
DIFF PNL FLD: ABNORMAL
EGFR: 122 ML/MIN/1.73M2 — SIGNIFICANT CHANGE UP
EOSINOPHIL # BLD AUTO: 0.01 K/UL — SIGNIFICANT CHANGE UP (ref 0–0.5)
EOSINOPHIL NFR BLD AUTO: 0.1 % — SIGNIFICANT CHANGE UP (ref 0–6)
EPI CELLS # UR: NEGATIVE /HPF — SIGNIFICANT CHANGE UP
GLUCOSE SERPL-MCNC: 104 MG/DL — HIGH (ref 70–99)
GLUCOSE UR QL: NEGATIVE — SIGNIFICANT CHANGE UP
HCG SERPL-ACNC: <1 MIU/ML — SIGNIFICANT CHANGE UP
HCT VFR BLD CALC: 37.8 % — SIGNIFICANT CHANGE UP (ref 34.5–45)
HGB BLD-MCNC: 12.3 G/DL — SIGNIFICANT CHANGE UP (ref 11.5–15.5)
IMM GRANULOCYTES NFR BLD AUTO: 0.6 % — SIGNIFICANT CHANGE UP (ref 0–0.9)
INR BLD: 1.24 RATIO — HIGH (ref 0.88–1.16)
KETONES UR-MCNC: NEGATIVE — SIGNIFICANT CHANGE UP
LACTATE SERPL-SCNC: 1 MMOL/L — SIGNIFICANT CHANGE UP (ref 0.7–2)
LEUKOCYTE ESTERASE UR-ACNC: ABNORMAL
LYMPHOCYTES # BLD AUTO: 1.37 K/UL — SIGNIFICANT CHANGE UP (ref 1–3.3)
LYMPHOCYTES # BLD AUTO: 13.4 % — SIGNIFICANT CHANGE UP (ref 13–44)
MCHC RBC-ENTMCNC: 27.2 PG — SIGNIFICANT CHANGE UP (ref 27–34)
MCHC RBC-ENTMCNC: 32.5 GM/DL — SIGNIFICANT CHANGE UP (ref 32–36)
MCV RBC AUTO: 83.6 FL — SIGNIFICANT CHANGE UP (ref 80–100)
MONOCYTES # BLD AUTO: 0.89 K/UL — SIGNIFICANT CHANGE UP (ref 0–0.9)
MONOCYTES NFR BLD AUTO: 8.7 % — SIGNIFICANT CHANGE UP (ref 2–14)
NEUTROPHILS # BLD AUTO: 7.89 K/UL — HIGH (ref 1.8–7.4)
NEUTROPHILS NFR BLD AUTO: 77 % — SIGNIFICANT CHANGE UP (ref 43–77)
NITRITE UR-MCNC: NEGATIVE — SIGNIFICANT CHANGE UP
NRBC # BLD: 0 /100 WBCS — SIGNIFICANT CHANGE UP (ref 0–0)
PH UR: 6 — SIGNIFICANT CHANGE UP (ref 5–8)
PLATELET # BLD AUTO: 418 K/UL — HIGH (ref 150–400)
POTASSIUM SERPL-MCNC: 3.4 MMOL/L — LOW (ref 3.5–5.3)
POTASSIUM SERPL-SCNC: 3.4 MMOL/L — LOW (ref 3.5–5.3)
PROT SERPL-MCNC: 8 G/DL — SIGNIFICANT CHANGE UP (ref 6–8.3)
PROT UR-MCNC: 30 MG/DL
PROTHROM AB SERPL-ACNC: 14.8 SEC — HIGH (ref 10.5–13.4)
RAPID RVP RESULT: SIGNIFICANT CHANGE UP
RBC # BLD: 4.52 M/UL — SIGNIFICANT CHANGE UP (ref 3.8–5.2)
RBC # FLD: 14 % — SIGNIFICANT CHANGE UP (ref 10.3–14.5)
RBC CASTS # UR COMP ASSIST: ABNORMAL /HPF (ref 0–2)
SARS-COV-2 RNA SPEC QL NAA+PROBE: SIGNIFICANT CHANGE UP
SODIUM SERPL-SCNC: 138 MMOL/L — SIGNIFICANT CHANGE UP (ref 135–145)
SP GR SPEC: 1.01 — SIGNIFICANT CHANGE UP (ref 1.01–1.02)
TROPONIN I, HIGH SENSITIVITY RESULT: 17.6 NG/L — SIGNIFICANT CHANGE UP
UROBILINOGEN FLD QL: NEGATIVE — SIGNIFICANT CHANGE UP
WBC # BLD: 10.24 K/UL — SIGNIFICANT CHANGE UP (ref 3.8–10.5)
WBC # FLD AUTO: 10.24 K/UL — SIGNIFICANT CHANGE UP (ref 3.8–10.5)
WBC UR QL: >50 /HPF (ref 0–5)

## 2022-10-04 PROCEDURE — 71275 CT ANGIOGRAPHY CHEST: CPT | Mod: 26,MA

## 2022-10-04 PROCEDURE — 74177 CT ABD & PELVIS W/CONTRAST: CPT | Mod: 26,MA

## 2022-10-04 PROCEDURE — 99285 EMERGENCY DEPT VISIT HI MDM: CPT

## 2022-10-04 RX ORDER — SODIUM CHLORIDE 9 MG/ML
2000 INJECTION INTRAMUSCULAR; INTRAVENOUS; SUBCUTANEOUS ONCE
Refills: 0 | Status: COMPLETED | OUTPATIENT
Start: 2022-10-04 | End: 2022-10-04

## 2022-10-04 RX ORDER — CEFTRIAXONE 500 MG/1
1000 INJECTION, POWDER, FOR SOLUTION INTRAMUSCULAR; INTRAVENOUS ONCE
Refills: 0 | Status: COMPLETED | OUTPATIENT
Start: 2022-10-04 | End: 2022-10-04

## 2022-10-04 RX ADMIN — SODIUM CHLORIDE 2000 MILLILITER(S): 9 INJECTION INTRAMUSCULAR; INTRAVENOUS; SUBCUTANEOUS at 21:17

## 2022-10-04 RX ADMIN — CEFTRIAXONE 100 MILLIGRAM(S): 500 INJECTION, POWDER, FOR SOLUTION INTRAMUSCULAR; INTRAVENOUS at 23:38

## 2022-10-04 NOTE — ED PROVIDER NOTE - OBJECTIVE STATEMENT
37-year-old female with past medical history recent  2 weeks ago presents with fever x2 weeks.  Patient reports fevers T-max 103 over the past 2 weeks associated with lower abdominal discomfort.  Patient states she fainted once yesterday and once today, denies any head injury from fall.  Patient reports taking ibuprofen approximately 3 hours ago.  Denies any chest pain, shortness of breath, palpitations, vomiting, diarrhea, dysuria, vaginal bleeding, vaginal discharge.  Denies any additional complaints.

## 2022-10-04 NOTE — ED PROVIDER NOTE - CLINICAL SUMMARY MEDICAL DECISION MAKING FREE TEXT BOX
Sharif: 37-year-old female with past medical history recent  2 weeks ago presents with fever x2 weeks.  Patient reports fevers T-max 103 over the past 2 weeks associated with lower abdominal discomfort.  Patient states she fainted once yesterday and once today, denies any head injury from fall.  Patient reports taking ibuprofen approximately 3 hours ago.  Denies any chest pain, shortness of breath, palpitations, vomiting, diarrhea, dysuria, vaginal bleeding, vaginal discharge.  Syncope likely in setting of dehydration/infection, pt with lower abdominal tenderness. Neuro exam nonfocal. Pt well appearing in NAD. No CP or SOB. Will obtain labs, imaging, supportive treatment with dispo pending workup.

## 2022-10-04 NOTE — ED PROVIDER NOTE - PHYSICAL EXAMINATION
CONSTITUTIONAL: non-toxic, well appearing  SKIN: no rash, no petechiae.  EYES: pink conjunctiva, anicteric  ENT: tongue and uvular midline, no exudates, dry mucous membranes  NECK: Supple; no meningismus, no JVD  CARD: RRR, no murmurs, equal radial pulses bilaterally 2+  RESP: CTAB, no respiratory distress  ABD: Soft, tender over lower abdomen, non-distended, no peritoneal signs  EXT: Normal ROM x4. No edema.  NEURO: Alert, oriented. Neuro exam nonfocal.  PSYCH: Cooperative, appropriate.

## 2022-10-04 NOTE — ED PROVIDER NOTE - NS ED ROS FT
Review of Systems    Constitutional: (+) fever, (+) chills, (+) fatigue  HEENT: (-) sore throat, (-) hearing loss, (-) nasal congestion  Cardiovascular: (-) chest pain, (+) syncope  Respiratory: (-) cough, (-) shortness of breath  Gastrointestinal: (-) vomiting, (-) diarrhea, (+) abdominal pain  Musculoskeletal: (-) neck pain, (-) back pain, (-) joint pain  Integumentary: (-) rash, (-) edema, (-) wound  Neurological: (-) headache, (-) altered mental status    Except as documented in the HPI, all other systems are negative.

## 2022-10-04 NOTE — ED PROVIDER NOTE - PROGRESS NOTE DETAILS
IV  Vanco/Zosyn ordered and additional fluid ordered.  OBGYN service endorsed. Ronaldo DO: Received sign out from Dr. Willingham  CTA chest : No PE  CT + A/P Postoperative changes of reported recent  with fluid collection   measuring up to 1.5 cm containing small foci of air identified within   incision site extending to endometrial canal. Wound dehiscence and/or   endometritis difficult to exclude.    Tubular shaped rim enhancing fluid collection measuring 5.1 x 2.2 cm   identified in the right lower quadrant/adnexal region with adjacent   extensive phlegmonous changes (37:19). Findings concerning for developing   abscess and/or pyelo-salpinx. Possibility of contained ruptured with   developing abscess considered. OB/GYN consultation is advised. Additional   rim-enhancing fluid collection within the endocervical canal measuring   3.6 x 0.9 cm (101:17). Pt to be admitted to Dr. Deleon's service (OBGYN) for IV abx.  I had a detailed discussion with the patient and/or guardian regarding the historical points, exam findings, and any diagnostic results supporting the admit diagnosis.

## 2022-10-05 DIAGNOSIS — L02.91 CUTANEOUS ABSCESS, UNSPECIFIED: ICD-10-CM

## 2022-10-05 DIAGNOSIS — R50.82 POSTPROCEDURAL FEVER: ICD-10-CM

## 2022-10-05 LAB
ANION GAP SERPL CALC-SCNC: 7 MMOL/L — SIGNIFICANT CHANGE UP (ref 5–17)
BASOPHILS # BLD AUTO: 0.01 K/UL — SIGNIFICANT CHANGE UP (ref 0–0.2)
BASOPHILS NFR BLD AUTO: 0.1 % — SIGNIFICANT CHANGE UP (ref 0–2)
BUN SERPL-MCNC: 12 MG/DL — SIGNIFICANT CHANGE UP (ref 7–18)
CALCIUM SERPL-MCNC: 8.4 MG/DL — SIGNIFICANT CHANGE UP (ref 8.4–10.5)
CHLORIDE SERPL-SCNC: 110 MMOL/L — HIGH (ref 96–108)
CO2 SERPL-SCNC: 23 MMOL/L — SIGNIFICANT CHANGE UP (ref 22–31)
CREAT SERPL-MCNC: 0.52 MG/DL — SIGNIFICANT CHANGE UP (ref 0.5–1.3)
EGFR: 123 ML/MIN/1.73M2 — SIGNIFICANT CHANGE UP
EOSINOPHIL # BLD AUTO: 0.03 K/UL — SIGNIFICANT CHANGE UP (ref 0–0.5)
EOSINOPHIL NFR BLD AUTO: 0.3 % — SIGNIFICANT CHANGE UP (ref 0–6)
ERYTHROCYTE [SEDIMENTATION RATE] IN BLOOD: 87 MM/HR — HIGH (ref 0–15)
GLUCOSE SERPL-MCNC: 115 MG/DL — HIGH (ref 70–99)
HCT VFR BLD CALC: 33.8 % — LOW (ref 34.5–45)
HGB BLD-MCNC: 10.9 G/DL — LOW (ref 11.5–15.5)
IMM GRANULOCYTES NFR BLD AUTO: 0.5 % — SIGNIFICANT CHANGE UP (ref 0–0.9)
LACTATE SERPL-SCNC: 0.6 MMOL/L — LOW (ref 0.7–2)
LYMPHOCYTES # BLD AUTO: 1.27 K/UL — SIGNIFICANT CHANGE UP (ref 1–3.3)
LYMPHOCYTES # BLD AUTO: 11.3 % — LOW (ref 13–44)
MCHC RBC-ENTMCNC: 26.7 PG — LOW (ref 27–34)
MCHC RBC-ENTMCNC: 32.2 GM/DL — SIGNIFICANT CHANGE UP (ref 32–36)
MCV RBC AUTO: 82.8 FL — SIGNIFICANT CHANGE UP (ref 80–100)
MONOCYTES # BLD AUTO: 0.86 K/UL — SIGNIFICANT CHANGE UP (ref 0–0.9)
MONOCYTES NFR BLD AUTO: 7.6 % — SIGNIFICANT CHANGE UP (ref 2–14)
NEUTROPHILS # BLD AUTO: 9.03 K/UL — HIGH (ref 1.8–7.4)
NEUTROPHILS NFR BLD AUTO: 80.2 % — HIGH (ref 43–77)
NRBC # BLD: 0 /100 WBCS — SIGNIFICANT CHANGE UP (ref 0–0)
PLATELET # BLD AUTO: 326 K/UL — SIGNIFICANT CHANGE UP (ref 150–400)
POTASSIUM SERPL-MCNC: 3.1 MMOL/L — LOW (ref 3.5–5.3)
POTASSIUM SERPL-SCNC: 3.1 MMOL/L — LOW (ref 3.5–5.3)
RBC # BLD: 4.08 M/UL — SIGNIFICANT CHANGE UP (ref 3.8–5.2)
RBC # FLD: 14.2 % — SIGNIFICANT CHANGE UP (ref 10.3–14.5)
SARS-COV-2 RNA SPEC QL NAA+PROBE: SIGNIFICANT CHANGE UP
SODIUM SERPL-SCNC: 140 MMOL/L — SIGNIFICANT CHANGE UP (ref 135–145)
WBC # BLD: 11.26 K/UL — HIGH (ref 3.8–10.5)
WBC # FLD AUTO: 11.26 K/UL — HIGH (ref 3.8–10.5)

## 2022-10-05 PROCEDURE — 99222 1ST HOSP IP/OBS MODERATE 55: CPT

## 2022-10-05 PROCEDURE — 72196 MRI PELVIS W/DYE: CPT | Mod: 26

## 2022-10-05 RX ORDER — VANCOMYCIN HCL 1 G
500 VIAL (EA) INTRAVENOUS EVERY 12 HOURS
Refills: 0 | Status: DISCONTINUED | OUTPATIENT
Start: 2022-10-05 | End: 2022-10-06

## 2022-10-05 RX ORDER — VANCOMYCIN HCL 1 G
500 VIAL (EA) INTRAVENOUS EVERY 12 HOURS
Refills: 0 | Status: DISCONTINUED | OUTPATIENT
Start: 2022-10-05 | End: 2022-10-05

## 2022-10-05 RX ORDER — VANCOMYCIN HCL 1 G
1000 VIAL (EA) INTRAVENOUS ONCE
Refills: 0 | Status: COMPLETED | OUTPATIENT
Start: 2022-10-05 | End: 2022-10-05

## 2022-10-05 RX ORDER — ACETAMINOPHEN 500 MG
975 TABLET ORAL EVERY 6 HOURS
Refills: 0 | Status: DISCONTINUED | OUTPATIENT
Start: 2022-10-05 | End: 2022-10-12

## 2022-10-05 RX ORDER — SODIUM CHLORIDE 9 MG/ML
1000 INJECTION, SOLUTION INTRAVENOUS
Refills: 0 | Status: DISCONTINUED | OUTPATIENT
Start: 2022-10-05 | End: 2022-10-12

## 2022-10-05 RX ORDER — PIPERACILLIN AND TAZOBACTAM 4; .5 G/20ML; G/20ML
3.38 INJECTION, POWDER, LYOPHILIZED, FOR SOLUTION INTRAVENOUS ONCE
Refills: 0 | Status: COMPLETED | OUTPATIENT
Start: 2022-10-05 | End: 2022-10-05

## 2022-10-05 RX ORDER — SODIUM CHLORIDE 9 MG/ML
1000 INJECTION INTRAMUSCULAR; INTRAVENOUS; SUBCUTANEOUS ONCE
Refills: 0 | Status: COMPLETED | OUTPATIENT
Start: 2022-10-05 | End: 2022-10-05

## 2022-10-05 RX ORDER — IBUPROFEN 200 MG
600 TABLET ORAL EVERY 6 HOURS
Refills: 0 | Status: DISCONTINUED | OUTPATIENT
Start: 2022-10-05 | End: 2022-10-12

## 2022-10-05 RX ORDER — PIPERACILLIN AND TAZOBACTAM 4; .5 G/20ML; G/20ML
3.38 INJECTION, POWDER, LYOPHILIZED, FOR SOLUTION INTRAVENOUS EVERY 8 HOURS
Refills: 0 | Status: COMPLETED | OUTPATIENT
Start: 2022-10-05 | End: 2022-10-11

## 2022-10-05 RX ADMIN — Medication 100 MILLIGRAM(S): at 22:10

## 2022-10-05 RX ADMIN — Medication 975 MILLIGRAM(S): at 09:39

## 2022-10-05 RX ADMIN — PIPERACILLIN AND TAZOBACTAM 25 GRAM(S): 4; .5 INJECTION, POWDER, LYOPHILIZED, FOR SOLUTION INTRAVENOUS at 14:29

## 2022-10-05 RX ADMIN — PIPERACILLIN AND TAZOBACTAM 25 GRAM(S): 4; .5 INJECTION, POWDER, LYOPHILIZED, FOR SOLUTION INTRAVENOUS at 05:42

## 2022-10-05 RX ADMIN — Medication 600 MILLIGRAM(S): at 11:40

## 2022-10-05 RX ADMIN — Medication 250 MILLIGRAM(S): at 02:51

## 2022-10-05 RX ADMIN — SODIUM CHLORIDE 1000 MILLILITER(S): 9 INJECTION INTRAMUSCULAR; INTRAVENOUS; SUBCUTANEOUS at 02:52

## 2022-10-05 RX ADMIN — SODIUM CHLORIDE 150 MILLILITER(S): 9 INJECTION, SOLUTION INTRAVENOUS at 09:42

## 2022-10-05 RX ADMIN — Medication 1 TABLET(S): at 22:45

## 2022-10-05 RX ADMIN — PIPERACILLIN AND TAZOBACTAM 200 GRAM(S): 4; .5 INJECTION, POWDER, LYOPHILIZED, FOR SOLUTION INTRAVENOUS at 02:51

## 2022-10-05 NOTE — H&P ADULT - NSHPPHYSICALEXAM_GEN_ALL_CORE
Gen: AAOx3, NAD, resting comfortably   Breast: mild engorgement, no erythema or warmth   Abd: soft, tender to deep palpation of RLQ, no rebound or guarding, fundus firm, incision c/d/i, no bleeding, drainage, discharge or erythema   no CVA tenderness   pelvic deferred    Ext: no edema

## 2022-10-05 NOTE — ED ADULT NURSE NOTE - NSIMPLEMENTINTERV_GEN_ALL_ED
Implemented All Universal Safety Interventions:  [place] to call system. Call bell, personal items and telephone within reach. Instruct patient to call for assistance. Room bathroom lighting operational. Non-slip footwear when patient is off stretcher. Physically safe environment: no spills, clutter or unnecessary equipment. Stretcher in lowest position, wheels locked, appropriate side rails in place.

## 2022-10-05 NOTE — ED ADULT NURSE REASSESSMENT NOTE - NS ED NURSE REASSESS COMMENT FT1
Pt feels warm to touch, refused Tylenol or Motrin  Transported to [address] [place] [address]

## 2022-10-05 NOTE — H&P ADULT - PROBLEM SELECTOR PLAN 1
-admit to GYN   -vanco/zosyn given in ER, continue Zosyn  -ID Consult  -IR consult  -blood cultures/urine cultures  -RVP neg   -f/u am labs  -motrin for pain  -tylenol for fever   -continue close monitoring   -case d/w Dr. Orellana

## 2022-10-05 NOTE — PROGRESS NOTE ADULT - ASSESSMENT
A/P: 38 yo readmit for post op fever, suspected abscess, POD22 c/s,   - ID consult appreciated, will continue  zosyn/vanco. f/u crp, sed rate, calcitonin  - IR consulted, recommend MRI for visualization f/u official read  - Pain management prn  -  regular diet  - manual breast pump given  -OOB and ambulate  d/w Dr Rivas, house  attending

## 2022-10-05 NOTE — PATIENT PROFILE ADULT - NSPROSPHOSPCHAPLAINYN_GEN_A_NUR
Name from pharmacy: FUROSEMIDE 40MG TABLETS          Will file in chart as: furosemide (LASIX) 40 MG tablet    Sig: Take 1 tablet by mouth daily.    Original sig: TAKE 1 TABLET BY MOUTH DAILY    Disp:  90 tablet    Refills:  11    Start: 6/13/2019    Class: Eprescribe    To pharmacy: **Patient requests 90 days supply**    Requested on: 6/13/2019    Last ordered: 4 days ago by Steven J Heyden, MD Last refill: 6/13/2019    Rx #: [id number]        Name from pharmacy: CALCITRIOL 0.25MCG CAPSULES         Will file in chart as: calcitRIOL (ROCALTROL) 0.25 MCG capsule    Sig: Take 1 capsule by mouth daily.    Original sig: TAKE 1 CAPSULE BY MOUTH DAILY    Disp:  90 capsule    Refills:  5    Start: 6/13/2019    Class: Eprescribe    To pharmacy: **Patient requests 90 days supply**    Requested on: 6/13/2019    Last ordered: 4 days ago by Steven J Heyden, MD Last refill: 6/13/2019    Rx #: [id number]       To be filled at: [email or web address] [address] [place] [address] [address] [address]             APPTS AND LABS ARE UP-TO-DATE  LAST OFFICE VISIT: 06/13/19  FOLLOW UP APPT: NONE     no

## 2022-10-05 NOTE — CONSULT NOTE ADULT - ASSESSMENT
HPI:    Jay for Jose ID # [id number]  37 year old female from [facility] who moved to [place] in , PMH hypothyroidism, pregestational diabetes,  PPD#22 s/p primary   due to pre gestational diabetes she presented to ER with complains of fever since for the past 2 weeks approximately as per pt post C section 1 week the fever started associated with weakness to the point she had episode loss of consciousness in the bathroom(fainting?), the highest temp was the day of admission Tmax 104 F and RLQ pain which prompted to come to the ED. Pt took tylenol at home with improvement of fever and abdominal pain for a few hours. Pt also c/o cramping and lower back pain. Pt both breastfeeding and breast pumping, her breast engorged and tender since yesterday. She denies any N/V or diarrhea, no dysuria, no abnormal vaginal discharge, there is lochia + which no malodorous secretions. Pt denies chest pain/sob, no  skin rash, no joint swelling, denies trauma. She is very worried about her baby who is at home alone with the .     PNC: Dr. Landon - pregestational diabetes   OBHx:  - complete sab  uncomplicated @ 4wks   2022, primary  for category 2 tracing, remote from delivery, with routine post op care, acute blood loss anemia    GYNHx: denies h/o uterine fibroids, ovarian cyst, sti, abnormal paps   PMH: hypothyroidism   Meds: synthroid 50mcg, asa 81mg, pnv, vitamin D  allergies:NKDA  social: denies h/o tobacco/alcohol/drug use  psych: denies h/o anxiety/depression/ptsd/trauma (05 Oct 2022 02:34)    Allergies: No Known Allergies    PAST MEDICAL & SURGICAL HISTORY: Hypothyroid   delivery delivered  SOCIAL HISTORY: denies toxic habits  FAMILY HISTORY:non contributory    Drug Dosing Weight  Height (cm): 149.9 (04 Oct 2022 18:37)  Weight (kg): 48.9 (04 Oct 2022 18:37)  BMI (kg/m2): 21.8 (04 Oct 2022 18:37)  BSA (m2): 1.42 (04 Oct 2022 18:37)  ANTIMICROBIALS:  piperacillin/tazobactam IVPB.. 3.375 every 8 hours    REVIEW OF SYSTEMS:  CONSTITUTIONAL: +weakness, + fevers and chills  EYES/ENT:  No visual changes;  No vertigo or throat pain   NECK:  No pain or stiffness  RESPIRATORY:  No cough, wheezing, hemoptysis; No shortness of breath  CARDIOVASCULAR:  No chest pain or palpitations  GASTROINTESTINAL:  +abdominal. No nausea, vomiting, or hematemesis; poor appetite, no diarrhea or constipation. No melena or hematochezia.  GENITOURINARY:  No dysuria, frequency or hematuria  NEUROLOGICAL:  No numbness or weakness  MSK: no back pain, no joint pain  SKIN:  No itching, rashes    PE:  Vital Signs Last 24 Hrs  T(C): 38.8 (05 Oct 2022 09:20), Max: 38.8 (05 Oct 2022 09:20)  T(F): 101.9 (05 Oct 2022 09:20), Max: 101.9 (05 Oct 2022 09:20)  HR: 131 (05 Oct 2022 09:20) (120 - 132)  BP: 97/63 (05 Oct 2022 09:20) (97/63 - 109/76)  RR: 15 (05 Oct 2022 09:20) (15 - 18)  SpO2: 97% (05 Oct 2022 09:20) (95% - 99%)    Parameters below as of 05 Oct 2022 09:20 Patient On (Oxygen Delivery Method): room air    Gen: AOx3, NAD  HEAD:  Atraumatic, Normocephalic  EYES: EOMI, PERRLA, conjunctiva and sclera clear  ENT: Moist mucous membranes  NECK: Supple, No JVD  CV: S1+S2 normal, +tachycardic  Breast: engorged, nodular and tender with lacteous secretion   Resp: Clear bilat, no resp distress, no crackles/wheezes  Abd: abdomen diffusely tender to palpation, RLQ tenderness and guarding, c- section surgical scar healing well, +BS  Ext: No LE edema, no wounds  : No Jacobs, + lochia  IV/Skin: No thrombophlebitis  Msk: No low back pain, no arthralgias, no joint swelling  Neuro: AAOx3. No sensory deficits, no motor deficits    LABS:                        12.3   10.24 )-----------( 418      ( 04 Oct 2022 20:20 )             37.8       10-04    138  |  104  |  10  ----------------------------<  104<H>  3.4<L>   |  22  |  0.53    Ca    9.0      04 Oct 2022 20:20    TPro  8.0  /  Alb  2.8<L>  /  TBili  0.2  /  DBili  x   /  AST  14  /  ALT  21  /  AlkPhos  113  10-04    Urinalysis Basic - ( 04 Oct 2022 20:20 )    Color: Yellow / Appearance: very cloudy / S.010 / pH: x  Gluc: x / Ketone: Negative  / Bili: Negative / Urobili: Negative   Blood: x / Protein: 30 mg/dL / Nitrite: Negative   Leuk Esterase: Moderate / RBC: 5-10 /HPF / WBC >50 /HPF   Sq Epi: x / Non Sq Epi: Negative /HPF / Bacteria: Moderate /HPF    MICROBIOLOGY:  v  Rapid RVP Result: NotDetec (10-04 @ 21:00)    RADIOLOGY: < from: CT Angio Chest PE Protocol w/ IV Cont (10.04.22 @ 23:39) >  FINDINGS:  CHEST:  LUNGS AND LARGE AIRWAYS: Patent central airways. Mild bibasilar dependent atelectasis.  PLEURA: No pleural effusion.  VESSELS: Normal aortic caliber. No pulmonary embolism though evaluation of subsegmental branches limited secondary respiratory motion artifact.  HEART: Heart size is normal. No pericardial effusion.  MEDIASTINUM AND JESÚS: No lymphadenopathy.  CHEST WALL AND LOWER NECK: Within normal limits.    ABDOMEN AND PELVIS:  LIVER: Within normal limits.  BILE DUCTS: Normal caliber.  GALLBLADDER: Within normal limits.  SPLEEN: Within normal limits.  PANCREAS: Within normal limits.  ADRENALS: Within normal limits.  KIDNEYS/URETERS: Marked dilatation of the right renal pelvis as well as mild dilatation of left renal pelvis.  BLADDER: Within normal limits.  REPRODUCTIVE ORGANS: Postoperative changes of reported recent  with fluid collection measuring up to 1.5 cm containing small foci of air identified within incision site extending to endometrial canal. Wound dehiscence and/or endometritis difficult to exclude. Tubular shaped rim enhancing fluid collection measuring 5.1 x 2.2 cm identified in the right lower quadrant/adnexal region with adjacent extensive phlegmonous changes (37:19). Findings concerning for developing abscess and/or pyelo-salpinx. Possibility of contained ruptured with developing abscess considered. Additional rim-enhancing fluid collection within the endocervical canal measuring 3.6 x 0.9 cm (101:17). Indeterminate large right adnexal complex cystic lesions measuring 8 x 5.8 cm and 4.7 x 3 cm identified.   Correlation with pelvic ultrasound and/or MRI is advised as patient remains at increased risk for right ovarian torsion.    BOWEL: No bowel obstruction. Abundant fecal material identified within mildly distended rectum. Normal appendix.  PERITONEUM: No ascites.  VESSELS: Within normal limits.  RETROPERITONEUM/LYMPHNODES: No lymphadenopathy.  ABDOMINAL WALL: Within normal limits.  BONES: Within normal limits.    IMPRESSION:    No pulmonary embolism though evaluation of subsegmental branches limited secondary respiratory motion artifact.  Marked dilatation of the right renal pelvis as well as mild dilatation of left renal pelvis.  Postoperative changes of reported recent  with fluid collection measuring up to 1.5 cm containing small foci of air identified within incision site extending to endometrial canal. Wound dehiscence and/or endometritis difficult to exclude.  Tubular shaped rim enhancing fluid collection measuring 5.1 x 2.2 cm identified in the right lower quadrant/adnexal region with adjacent extensive phlegmonous changes (37:19). Findings concerning for developing abscess and/or pyelo-salpinx. Possibility of contained ruptured with developing abscess considered. OB/GYN consultation is advised. Additional rim-enhancing fluid collection within the endocervical canal measuring 3.6 x 0.9 cm (101:17).  Indeterminate large right adnexal complex cystic lesions measuring 8 x 5.8 cm and 4.7 x 3 cm identified. Correlation with pelvic ultrasound and/or MRI is advised as patient remains at increased risk for right ovarian torsion.    IMPRESSION:  37 year old female from [facility] who moved to [place] in , PMH hypothyroidism, pregestational diabetes,  PPD#22 s/p primary   due to pre gestational diabetes admitted for post OP infection-intraabdominal abscess.    INTRA-ABDOMINAL ABSCESS(multiple):  -Incision site extending to endometrial canal collection 1.5 cm  -RLQ/adnexal region Fluid collection (5.1 x 2.2 cm) with extensive phlegmonous changes concerning for abscess/pyelo-salpinx   -Fluid collection within endocervical canal (3.6 x 0.9)  -Indeterminate large right adnexal complex cystic lesions measuring 8 x 5.8 cm and 4.7 x 3 cm identified.    PLAN:  Continue Zosyn 3/375 g q8 hrs IV  Vancomycin  mg q12 hrs IV  Check pro elizabeth, ESR and CRP  Blood cultures x 2 sets  GC/Chlamydia  IR consulted for possible abscess drain, largest R adnexal complex cystic lesions under investigation, planned pelvic MRI  OB/GYN (source control) there is concern on CT for surgical wound dehiscence   ID will continue to follow up  Discussed with pt and her brother on the bedside    Please reach ID with any questions or concerns  Dr. Gretchen Navas  Tel. [phone]  Available in Teams     HPI:    Jay for Jose ID # [id number]  37 year old female from [facility] who moved to [place] in , PMH hypothyroidism, pregestational diabetes,  PPD#22 s/p primary   due to pre gestational diabetes she presented to ER with complains of fever since for the past 2 weeks approximately as per pt post C section 1 week the fever started associated with weakness to the point she had episode loss of consciousness in the bathroom(fainting?), the highest temp was the day of admission Tmax 104 F and RLQ pain which prompted to come to the ED. Pt took tylenol at home with improvement of fever and abdominal pain for a few hours. Pt also c/o cramping and lower back pain. Pt both breastfeeding and breast pumping, her breast engorged and tender since yesterday. She denies any N/V or diarrhea, no dysuria, no abnormal vaginal discharge, there is lochia + which no malodorous secretions. Pt denies chest pain/sob, no  skin rash, no joint swelling, denies trauma. She is very worried about her baby who is at home alone with the .     PNC: Dr. Landon - pregestational diabetes   OBHx:  - complete sab  uncomplicated @ 4wks   2022, primary  for category 2 tracing, remote from delivery, with routine post op care, acute blood loss anemia    GYNHx: denies h/o uterine fibroids, ovarian cyst, sti, abnormal paps   PMH: hypothyroidism   Meds: synthroid 50mcg, asa 81mg, pnv, vitamin D  allergies:NKDA  social: denies h/o tobacco/alcohol/drug use  psych: denies h/o anxiety/depression/ptsd/trauma (05 Oct 2022 02:34)    Allergies: No Known Allergies    PAST MEDICAL & SURGICAL HISTORY: Hypothyroid   delivery delivered  SOCIAL HISTORY: denies toxic habits  FAMILY HISTORY:non contributory    Drug Dosing Weight  Height (cm): 149.9 (04 Oct 2022 18:37)  Weight (kg): 48.9 (04 Oct 2022 18:37)  BMI (kg/m2): 21.8 (04 Oct 2022 18:37)  BSA (m2): 1.42 (04 Oct 2022 18:37)  ANTIMICROBIALS:  piperacillin/tazobactam IVPB.. 3.375 every 8 hours    REVIEW OF SYSTEMS:  CONSTITUTIONAL: +weakness, + fevers and chills  EYES/ENT:  No visual changes;  No vertigo or throat pain   NECK:  No pain or stiffness  RESPIRATORY:  No cough, wheezing, hemoptysis; No shortness of breath  CARDIOVASCULAR:  No chest pain or palpitations  GASTROINTESTINAL:  +abdominal. No nausea, vomiting, or hematemesis; poor appetite, no diarrhea or constipation. No melena or hematochezia.  GENITOURINARY:  No dysuria, frequency or hematuria  NEUROLOGICAL:  No numbness or weakness  MSK: no back pain, no joint pain  SKIN:  No itching, rashes    PE:  Vital Signs Last 24 Hrs  T(C): 38.8 (05 Oct 2022 09:20), Max: 38.8 (05 Oct 2022 09:20)  T(F): 101.9 (05 Oct 2022 09:20), Max: 101.9 (05 Oct 2022 09:20)  HR: 131 (05 Oct 2022 09:20) (120 - 132)  BP: 97/63 (05 Oct 2022 09:20) (97/63 - 109/76)  RR: 15 (05 Oct 2022 09:20) (15 - 18)  SpO2: 97% (05 Oct 2022 09:20) (95% - 99%)    Parameters below as of 05 Oct 2022 09:20 Patient On (Oxygen Delivery Method): room air    Gen: AOx3, NAD  HEAD:  Atraumatic, Normocephalic  EYES: EOMI, PERRLA, conjunctiva and sclera clear  ENT: Moist mucous membranes  NECK: Supple, No JVD  CV: S1+S2 normal, +tachycardic  Breast: engorged, nodular and tender with lacteous secretion   Resp: Clear bilat, no resp distress, no crackles/wheezes  Abd: abdomen diffusely tender to palpation, RLQ tenderness and guarding, c- section surgical scar healing well, +BS  Ext: No LE edema, no wounds  : No Jacobs, + lochia  IV/Skin: No thrombophlebitis  Msk: No low back pain, no arthralgias, no joint swelling  Neuro: AAOx3. No sensory deficits, no motor deficits    LABS:                        12.3   10.24 )-----------( 418      ( 04 Oct 2022 20:20 )             37.8       10-04    138  |  104  |  10  ----------------------------<  104<H>  3.4<L>   |  22  |  0.53    Ca    9.0      04 Oct 2022 20:20    TPro  8.0  /  Alb  2.8<L>  /  TBili  0.2  /  DBili  x   /  AST  14  /  ALT  21  /  AlkPhos  113  10-04    Urinalysis Basic - ( 04 Oct 2022 20:20 )    Color: Yellow / Appearance: very cloudy / S.010 / pH: x  Gluc: x / Ketone: Negative  / Bili: Negative / Urobili: Negative   Blood: x / Protein: 30 mg/dL / Nitrite: Negative   Leuk Esterase: Moderate / RBC: 5-10 /HPF / WBC >50 /HPF   Sq Epi: x / Non Sq Epi: Negative /HPF / Bacteria: Moderate /HPF    MICROBIOLOGY:  v  Rapid RVP Result: NotDetec (10-04 @ 21:00)    RADIOLOGY: < from: CT Angio Chest PE Protocol w/ IV Cont (10.04.22 @ 23:39) >  FINDINGS:  CHEST:  LUNGS AND LARGE AIRWAYS: Patent central airways. Mild bibasilar dependent atelectasis.  PLEURA: No pleural effusion.  VESSELS: Normal aortic caliber. No pulmonary embolism though evaluation of subsegmental branches limited secondary respiratory motion artifact.  HEART: Heart size is normal. No pericardial effusion.  MEDIASTINUM AND JESÚS: No lymphadenopathy.  CHEST WALL AND LOWER NECK: Within normal limits.    ABDOMEN AND PELVIS:  LIVER: Within normal limits.  BILE DUCTS: Normal caliber.  GALLBLADDER: Within normal limits.  SPLEEN: Within normal limits.  PANCREAS: Within normal limits.  ADRENALS: Within normal limits.  KIDNEYS/URETERS: Marked dilatation of the right renal pelvis as well as mild dilatation of left renal pelvis.  BLADDER: Within normal limits.  REPRODUCTIVE ORGANS: Postoperative changes of reported recent  with fluid collection measuring up to 1.5 cm containing small foci of air identified within incision site extending to endometrial canal. Wound dehiscence and/or endometritis difficult to exclude. Tubular shaped rim enhancing fluid collection measuring 5.1 x 2.2 cm identified in the right lower quadrant/adnexal region with adjacent extensive phlegmonous changes (37:19). Findings concerning for developing abscess and/or pyelo-salpinx. Possibility of contained ruptured with developing abscess considered. Additional rim-enhancing fluid collection within the endocervical canal measuring 3.6 x 0.9 cm (101:17). Indeterminate large right adnexal complex cystic lesions measuring 8 x 5.8 cm and 4.7 x 3 cm identified.   Correlation with pelvic ultrasound and/or MRI is advised as patient remains at increased risk for right ovarian torsion.    BOWEL: No bowel obstruction. Abundant fecal material identified within mildly distended rectum. Normal appendix.  PERITONEUM: No ascites.  VESSELS: Within normal limits.  RETROPERITONEUM/LYMPHNODES: No lymphadenopathy.  ABDOMINAL WALL: Within normal limits.  BONES: Within normal limits.    IMPRESSION:    No pulmonary embolism though evaluation of subsegmental branches limited secondary respiratory motion artifact.  Marked dilatation of the right renal pelvis as well as mild dilatation of left renal pelvis.  Postoperative changes of reported recent  with fluid collection measuring up to 1.5 cm containing small foci of air identified within incision site extending to endometrial canal. Wound dehiscence and/or endometritis difficult to exclude.  Tubular shaped rim enhancing fluid collection measuring 5.1 x 2.2 cm identified in the right lower quadrant/adnexal region with adjacent extensive phlegmonous changes (37:19). Findings concerning for developing abscess and/or pyelo-salpinx. Possibility of contained ruptured with developing abscess considered. OB/GYN consultation is advised. Additional rim-enhancing fluid collection within the endocervical canal measuring 3.6 x 0.9 cm (101:17).  Indeterminate large right adnexal complex cystic lesions measuring 8 x 5.8 cm and 4.7 x 3 cm identified. Correlation with pelvic ultrasound and/or MRI is advised as patient remains at increased risk for right ovarian torsion.    IMPRESSION:  37 year old female from [facility] who moved to [place] in , PMH hypothyroidism, pregestational diabetes,  PPD#22 s/p primary   due to pre gestational diabetes admitted for post OP infection-intraabdominal abscess.    Intraabdominal collection:   -Incision site extending to endometrial canal collection 1.5 cm with concerns for wound dehiscence  -RLQ/adnexal region Fluid collection (5.1 x 2.2 cm) with extensive phlegmonous changes concerning for abscess/pyelo-salpinx   -Fluid collection within endocervical canal (3.6 x 0.9)  -Indeterminate large right adnexal complex cystic lesions measuring 8 x 5.8 cm and 4.7 x 3 cm identified.    PLAN:  Continue Zosyn 3/375 g q8 hrs IV  Vancomycin  mg q12 hrs IV  Check pro elizabeth, ESR and CRP  Blood cultures x 2 sets  GC/Chlamydia  IR consulted for possible abscess drain, largest R adnexal complex cystic lesions under investigation, planned pelvic MRI  Case discussed with OB/GYN attending covering and PA  ID will continue to follow up  Discussed with pt and her brother on the bedside    Please reach ID with any questions or concerns  Dr. Gretchen Navas  Tel. [phone]  Available in Teams

## 2022-10-05 NOTE — H&P ADULT - HISTORY OF PRESENT ILLNESS
37 year old  PPD#22 s/p primary  presents to ER with complaint of fever since yesterday. Pt also complains of having loss of consciousness in the bathroom. Pt states she starting having fevers 1 week post discharge and had attributed it to breast engorgement. Pt noticed to have fever of 104 yesterday with RLQ pain and was told to come to ER. Pt took tylenol at home with improvement for a few hours. Pt also c/o cramping and lower back pain. Pt both breastfeeding and breast pumping. Pt denies chest pain/sob, denies increased vaginal bleeding/discharge, denies trauma.    PNC: Dr. Landon - pregestational diabetes   OBHx:  - complete sab  uncomplicated @ 4wks   2022, primary  for category 2 tracing, remote from delivery, with routine post op care, acute blood loss anemia    GYNHx: denies h/o uterine fibroids, ovarian cyst, sti, abnormal paps   PMH: hypothyroidism   Meds: synthroid 50mcg, asa 81mg, pnv, vitamin D  allergies: nka   social: denies h/o tobacco/alcohol/drug use  psych: denies h/o anxiety/depression/ptsd/trauma

## 2022-10-05 NOTE — H&P ADULT - NSHPLABSRESULTS_GEN_ALL_CORE
12.3   10.24 )-----------( 418      ( 04 Oct 2022 20:20 )             37.8   10-04    138  |  104  |  10  ----------------------------<  104<H>  3.4<L>   |  22  |  0.53    Ca    9.0      04 Oct 2022 20:20    TPro  8.0  /  Alb  2.8<L>  /  TBili  0.2  /  DBili  x   /  AST  14  /  ALT  21  /  AlkPhos  113  10      < from: CT Abdomen and Pelvis w/ IV Cont (10.04.22 @ 23:39) >    IMPRESSION:    No pulmonary embolism though evaluation of subsegmental branches limited   secondary respiratory motion artifact.    Marked dilatation of the right renal pelvis as well as mild dilatation of   left renal pelvis.    Postoperative changes of reported recent  with fluid collection   measuring up to 1.5 cm containing small foci of air identified within   incision site extending to endometrial canal. Wound dehiscence and/or   endometritis difficult to exclude.    Tubular shaped rim enhancing fluid collection measuring 5.1 x 2.2 cm   identified in the right lower quadrant/adnexal region with adjacent   extensive phlegmonous changes (37:19). Findings concerning for developing   abscess and/or pyelo-salpinx. Possibility of contained ruptured with   developing abscess considered. OB/GYN consultation is advised. Additional   rim-enhancing fluid collection within the endocervical canal measuring   3.6 x 0.9 cm (101:17).    Indeterminate large right adnexal complex cystic lesions measuring 8 x   5.8 cm and 4.7 x 3 cm identified. Correlation with pelvic ultrasound   and/or MRI is advised as patient remains at increased risk for right   ovarian torsion.    These critical results were discussed via telephone at 10/5/2022 12:38 AM   by Dr. Hsieh of radiology with Dr. Higgins, read-back verification policy   was followed.    --- End of Report ---    < end of copied text >

## 2022-10-06 LAB
ANION GAP SERPL CALC-SCNC: 9 MMOL/L — SIGNIFICANT CHANGE UP (ref 5–17)
BASOPHILS # BLD AUTO: 0.03 K/UL — SIGNIFICANT CHANGE UP (ref 0–0.2)
BASOPHILS NFR BLD AUTO: 0.3 % — SIGNIFICANT CHANGE UP (ref 0–2)
BUN SERPL-MCNC: 7 MG/DL — SIGNIFICANT CHANGE UP (ref 7–18)
CALCIT SERPL-MCNC: <1 PG/ML — SIGNIFICANT CHANGE UP
CALCIUM SERPL-MCNC: 9 MG/DL — SIGNIFICANT CHANGE UP (ref 8.4–10.5)
CHLORIDE SERPL-SCNC: 110 MMOL/L — HIGH (ref 96–108)
CO2 SERPL-SCNC: 22 MMOL/L — SIGNIFICANT CHANGE UP (ref 22–31)
CREAT SERPL-MCNC: 0.59 MG/DL — SIGNIFICANT CHANGE UP (ref 0.5–1.3)
CRP SERPL-MCNC: 104 MG/L — HIGH
CULTURE RESULTS: SIGNIFICANT CHANGE UP
EGFR: 119 ML/MIN/1.73M2 — SIGNIFICANT CHANGE UP
EOSINOPHIL # BLD AUTO: 0.02 K/UL — SIGNIFICANT CHANGE UP (ref 0–0.5)
EOSINOPHIL NFR BLD AUTO: 0.2 % — SIGNIFICANT CHANGE UP (ref 0–6)
GLUCOSE SERPL-MCNC: 144 MG/DL — HIGH (ref 70–99)
HCT VFR BLD CALC: 36.4 % — SIGNIFICANT CHANGE UP (ref 34.5–45)
HGB BLD-MCNC: 11.5 G/DL — SIGNIFICANT CHANGE UP (ref 11.5–15.5)
IMM GRANULOCYTES NFR BLD AUTO: 0.3 % — SIGNIFICANT CHANGE UP (ref 0–0.9)
LYMPHOCYTES # BLD AUTO: 1.7 K/UL — SIGNIFICANT CHANGE UP (ref 1–3.3)
LYMPHOCYTES # BLD AUTO: 14.7 % — SIGNIFICANT CHANGE UP (ref 13–44)
MCHC RBC-ENTMCNC: 26.4 PG — LOW (ref 27–34)
MCHC RBC-ENTMCNC: 31.6 GM/DL — LOW (ref 32–36)
MCV RBC AUTO: 83.5 FL — SIGNIFICANT CHANGE UP (ref 80–100)
MONOCYTES # BLD AUTO: 0.8 K/UL — SIGNIFICANT CHANGE UP (ref 0–0.9)
MONOCYTES NFR BLD AUTO: 6.9 % — SIGNIFICANT CHANGE UP (ref 2–14)
NEUTROPHILS # BLD AUTO: 8.95 K/UL — HIGH (ref 1.8–7.4)
NEUTROPHILS NFR BLD AUTO: 77.6 % — HIGH (ref 43–77)
NRBC # BLD: 0 /100 WBCS — SIGNIFICANT CHANGE UP (ref 0–0)
PLATELET # BLD AUTO: 409 K/UL — HIGH (ref 150–400)
POTASSIUM SERPL-MCNC: 3.7 MMOL/L — SIGNIFICANT CHANGE UP (ref 3.5–5.3)
POTASSIUM SERPL-SCNC: 3.7 MMOL/L — SIGNIFICANT CHANGE UP (ref 3.5–5.3)
RBC # BLD: 4.36 M/UL — SIGNIFICANT CHANGE UP (ref 3.8–5.2)
RBC # FLD: 14.2 % — SIGNIFICANT CHANGE UP (ref 10.3–14.5)
SODIUM SERPL-SCNC: 141 MMOL/L — SIGNIFICANT CHANGE UP (ref 135–145)
SPECIMEN SOURCE: SIGNIFICANT CHANGE UP
WBC # BLD: 11.53 K/UL — HIGH (ref 3.8–10.5)
WBC # FLD AUTO: 11.53 K/UL — HIGH (ref 3.8–10.5)

## 2022-10-06 PROCEDURE — 99233 SBSQ HOSP IP/OBS HIGH 50: CPT

## 2022-10-06 RX ORDER — POTASSIUM CHLORIDE 20 MEQ
40 PACKET (EA) ORAL EVERY 4 HOURS
Refills: 0 | Status: COMPLETED | OUTPATIENT
Start: 2022-10-06 | End: 2022-10-06

## 2022-10-06 RX ADMIN — PIPERACILLIN AND TAZOBACTAM 25 GRAM(S): 4; .5 INJECTION, POWDER, LYOPHILIZED, FOR SOLUTION INTRAVENOUS at 16:26

## 2022-10-06 RX ADMIN — Medication 100 MILLIGRAM(S): at 12:04

## 2022-10-06 RX ADMIN — PIPERACILLIN AND TAZOBACTAM 25 GRAM(S): 4; .5 INJECTION, POWDER, LYOPHILIZED, FOR SOLUTION INTRAVENOUS at 07:58

## 2022-10-06 RX ADMIN — Medication 40 MILLIEQUIVALENT(S): at 07:59

## 2022-10-06 RX ADMIN — Medication 40 MILLIEQUIVALENT(S): at 04:17

## 2022-10-06 RX ADMIN — Medication 1 TABLET(S): at 12:43

## 2022-10-06 RX ADMIN — SODIUM CHLORIDE 150 MILLILITER(S): 9 INJECTION, SOLUTION INTRAVENOUS at 04:17

## 2022-10-06 RX ADMIN — PIPERACILLIN AND TAZOBACTAM 25 GRAM(S): 4; .5 INJECTION, POWDER, LYOPHILIZED, FOR SOLUTION INTRAVENOUS at 00:29

## 2022-10-06 RX ADMIN — Medication 40 MILLIEQUIVALENT(S): at 00:29

## 2022-10-06 NOTE — PROGRESS NOTE ADULT - ASSESSMENT
A/P: 37 year old  PPD#23 s/p primary  with fever and tachycardia, suspected intraabdominal abscess

## 2022-10-06 NOTE — LACTATION INITIAL EVALUATION - LACTATION INTERVENTIONS
visited pt. on [facility]([id number]) as requested by GYN PA; pt. was readmitted for post-op fever, suspected abscess and breast engorgement with c/o abdominal pain and fever of 104 F at home and syncope; pt. found in bed; on breast exam noted to have firm, tender (R) breast without redness nor hot to touch; (L) breast exam WNL; pt. has initiated expression with pump at bedside; taught hand expression and advised to use warm compress and massage to soften breast prior to expression follwd by cold compresses to decrease swelling; reviewed hand expression technique; pt. on Vancomycin and zosyn per report; pt. choosing to discard her EHM for now until she "gets better" reviewed s/s mastitis and risk of not emptying breast adequately; spoke with GYN PA and pt's RN regarding LCs recommendations/initiate/review hand expression/initiate/review pumping guidelines and safe milk handling/review techniques to manage sore nipples/engorgement

## 2022-10-06 NOTE — PROGRESS NOTE ADULT - ASSESSMENT
Subjective:    OTHER MEDS:  acetaminophen     Tablet .. 975 milliGRAM(s) Oral every 6 hours PRN  ibuprofen  Tablet. 600 milliGRAM(s) Oral every 6 hours PRN  lactated ringers. 1000 milliLiter(s) IV Continuous <Continuous>  prenatal multivitamin 1 Tablet(s) Oral daily    REVIEW OF SYSTEMS:  CONSTITUTIONAL:  No weakness, fevers or chills  EYES/ENT:  No visual changes;  No vertigo or throat pain   NECK:  No pain or stiffness  RESPIRATORY:  No cough, wheezing, hemoptysis; No shortness of breath  CARDIOVASCULAR:  No chest pain or palpitations  GASTROINTESTINAL:  No abdominal or epigastric pain. No nausea, vomiting, or hematemesis; No diarrhea or constipation. No melena or hematochezia.  GENITOURINARY:  No dysuria, frequency or hematuria  NEUROLOGICAL:  No numbness or weakness  MSK: no back pain, no joint pain  SKIN:  No itching, rashes    PE:  Vital Signs Last 24 Hrs  T(C): 37.7 (06 Oct 2022 05:05), Max: 37.7 (06 Oct 2022 05:05)  T(F): 99.9 (06 Oct 2022 05:05), Max: 99.9 (06 Oct 2022 05:05)  HR: 123 (06 Oct 2022 05:05) (97 - 123)  BP: 101/61 (06 Oct 2022 05:05) (91/61 - 101/67)  BP(mean): --  RR: 16 (06 Oct 2022 05:05) (16 - 18)  SpO2: 95% (06 Oct 2022 05:05) (95% - 99%)    Parameters below as of 06 Oct 2022 05:05  Patient On (Oxygen Delivery Method): room air      Gen: AOx3, NAD, non-toxic, pleasant  HEAD:  Atraumatic, Normocephalic  EYES: EOMI, PERRLA, conjunctiva and sclera clear  ENT: Moist mucous membranes  NECK: Supple, No JVD  CV: S1+S2 normal, nontachycardic  Resp: Clear bilat, no resp distress, no crackles/wheezes  Abd: Soft, nontender, +BS  Ext: No LE edema, no wounds  : No Jacobs  IV/Skin: No thrombophlebitis  Msk: No low back pain, no arthralgias, no joint swelling  Neuro: AAOx3. No sensory deficits, no motor deficits    LABS:                        11.5   11.53 )-----------( 409      ( 06 Oct 2022 08:45 )             36.4     10    141  |  110<H>  |  7   ----------------------------<  144<H>  3.7   |  22  |  0.59    Ca    9.0      06 Oct 2022 08:45    TPro  8.0  /  Alb  2.8<L>  /  TBili  0.2  /  DBili  x   /  AST  14  /  ALT  21  /  AlkPhos  113  10-04    Urinalysis Basic - ( 04 Oct 2022 20:20 )    Color: Yellow / Appearance: very cloudy / S.010 / pH: x  Gluc: x / Ketone: Negative  / Bili: Negative / Urobili: Negative   Blood: x / Protein: 30 mg/dL / Nitrite: Negative   Leuk Esterase: Moderate / RBC: 5-10 /HPF / WBC >50 /HPF   Sq Epi: x / Non Sq Epi: Negative /HPF / Bacteria: Moderate /HPF      MICROBIOLOGY:  v  .Blood Blood-Peripheral  10-04-22   No growth to date.  --  --      .Blood Blood-Peripheral  10-04-22   No growth to date.  --  --    Rapid RVP Result: NotDetec (10-04 @ 21:00)    C-Reactive Protein, Serum: 104 mg/L (10-05-22 @ 22:40)  Sedimentation Rate, Erythrocyte: 87 mm/Hr (10.05.22 @ 22:40)    RADIOLOGY: < from: MR Pelvis w/ IV Cont (10.05.22 @ 18:36) >  IMPRESSION:    Right-sided hydrosalpinx with restricted diffusion and extensive surrounding enhancement, likely phlegmonous change and a portion of the right ovary consistent with pyosalpinx/tubo-ovarian abscess. 7.3 cm right ovarian endometrioma.    Adjacent foci of hemorrhage at the posterior aspect of the right hemipelvis with the larger measuring 5.9 cm, either additional endometriomas or loculated hemorrhagic collections.    Small collection in the posterior right hemipelvis measuring 1.5 x 0.8 cm demonstrating restricted diffusion which may be postoperative in etiology or related to endometriosis, however underlying infection cannot be excluded.    Restricted diffusion involving the endometrium with a fluid collection at the  section site measuring 2.5 x 1.7 cm; restricted diffusion could be related to the presence of hemorrhage, however underlying infection/endometritis cannot be excluded.    Small amount of enhancing tissue within the superior aspect of the endometrium, possibly postoperative in etiology.    Partially imaged bilateral hydronephrosis, greater on the right, also seen on the prior CT.    Left ovarian dermoid measuring 2.7 cm.    IMPRESSION:  Jose  Andrea ID # [id number]  7 year old female from [facility] who moved to [place] in , PMH hypothyroidism, pregestational diabetes,  PPD#22 s/p primary   due to pre gestational diabetes admitted for post OP infection-intraabdominal abscess.    Intraabdominal collection:   -Right pyosalpinx/tubo-ovarian abscess with extensive surrounding phlegmonous change   -R post hemipelvis hemorrhage concerning for loculated hemorrhagic collections.  -BL hydronephrosis worse on R kidney  -Post surgical changes    PLAN:  -Please d/c vancomycin(BC NGTD)  -Continue Zosyn 3.375 g (4hrs infusion) q8 hrs IV  -Follow up cultures NGTD  -Trend ESR and CRP  -IR follow up for abscess drainage if amenable(obtain BF gram stain and cultures) however she might need additional surgical exploration for this R TOA and also the hydronephrosis is concerning since could be due to significant inflammation but it could be also 2/2 past surgery)  -F/up OB/GYN  -Discussed with OB PA  -Discussed in details and at length with the pt with help of the Jose     Please reach ID with any questions or concerns  Dr. Gretchen Navas  Tel. [phone]  Available in Teams     Subjective: NAD, afebrile, feeling better today, no N/V or diarrhea, tolerating PO, pumping her breasts, significant RLQ abd pain, no dysuria    REVIEW OF SYSTEMS:  CONSTITUTIONAL:  No weakness, fevers or chills  EYES/ENT:  No visual changes;  No vertigo or throat pain   NECK:  No pain or stiffness  RESPIRATORY:  No cough, wheezing, hemoptysis; No shortness of breath  CARDIOVASCULAR:  No chest pain or palpitations  GASTROINTESTINAL:  abdominal pain. No nausea, vomiting, or hematemesis; No diarrhea or constipation. No melena or hematochezia.  GENITOURINARY:  No dysuria, frequency or hematuria  NEUROLOGICAL:  No numbness or weakness  MSK: no back pain, no joint pain  SKIN:  No itching, rashes    PE:  Vital Signs Last 24 Hrs  T(C): 37.7 (06 Oct 2022 05:05), Max: 37.7 (06 Oct 2022 05:05)  T(F): 99.9 (06 Oct 2022 05:05), Max: 99.9 (06 Oct 2022 05:05)  HR: 123 (06 Oct 2022 05:05) (97 - 123)  BP: 101/61 (06 Oct 2022 05:05) (91/61 - 101/67)  BP(mean): --  RR: 16 (06 Oct 2022 05:05) (16 - 18)  SpO2: 95% (06 Oct 2022 05:05) (95% - 99%)    Parameters below as of 06 Oct 2022 05:05 Patient On (Oxygen Delivery Method): room air    Gen: AOx3, NAD, non-toxic, pleasant  HEAD:  Atraumatic, Normocephalic  EYES: EOMI, PERRLA, conjunctiva and sclera clear  ENT: Moist mucous membranes  NECK: Supple, No JVD  CV: S1+S2 normal, tachycardic  Resp: Clear bilat, no resp distress, no crackles/wheezes  Abd: RLQ tenderness to palpation and significant guarding, no rebound, BS +  Ext: No LE edema, no wounds  : No Jacobs  IV/Skin: No thrombophlebitis  Msk: No low back pain, no arthralgias, no joint swelling  Neuro: AAOx3. No sensory deficits, no motor deficits    LABS:                        11.5   11.53 )-----------( 409      ( 06 Oct 2022 08:45 )             36.4     10-06    141  |  110<H>  |  7   ----------------------------<  144<H>  3.7   |  22  |  0.59    Ca    9.0      06 Oct 2022 08:45    TPro  8.0  /  Alb  2.8<L>  /  TBili  0.2  /  DBili  x   /  AST  14  /  ALT  21  /  AlkPhos  113  10-04    Urinalysis Basic - ( 04 Oct 2022 20:20 )    Color: Yellow / Appearance: very cloudy / S.010 / pH: x  Gluc: x / Ketone: Negative  / Bili: Negative / Urobili: Negative   Blood: x / Protein: 30 mg/dL / Nitrite: Negative   Leuk Esterase: Moderate / RBC: 5-10 /HPF / WBC >50 /HPF   Sq Epi: x / Non Sq Epi: Negative /HPF / Bacteria: Moderate /HPF      MICROBIOLOGY:  v  .Blood Blood-Peripheral  10-04-22   No growth to date.  --  --      .Blood Blood-Peripheral  10-04-22   No growth to date.  --  --    Rapid RVP Result: NotDetec (10-04 @ 21:00)    C-Reactive Protein, Serum: 104 mg/L (10-05-22 @ 22:40)  Sedimentation Rate, Erythrocyte: 87 mm/Hr (10.05.22 @ 22:40)    RADIOLOGY: < from: MR Pelvis w/ IV Cont (10.05.22 @ 18:36) >  IMPRESSION:    Right-sided hydrosalpinx with restricted diffusion and extensive surrounding enhancement, likely phlegmonous change and a portion of the right ovary consistent with pyosalpinx/tubo-ovarian abscess. 7.3 cm right ovarian endometrioma.    Adjacent foci of hemorrhage at the posterior aspect of the right hemipelvis with the larger measuring 5.9 cm, either additional endometriomas or loculated hemorrhagic collections.    Small collection in the posterior right hemipelvis measuring 1.5 x 0.8 cm demonstrating restricted diffusion which may be postoperative in etiology or related to endometriosis, however underlying infection cannot be excluded.    Restricted diffusion involving the endometrium with a fluid collection at the  section site measuring 2.5 x 1.7 cm; restricted diffusion could be related to the presence of hemorrhage, however underlying infection/endometritis cannot be excluded.    Small amount of enhancing tissue within the superior aspect of the endometrium, possibly postoperative in etiology.    Partially imaged bilateral hydronephrosis, greater on the right, also seen on the prior CT.    Left ovarian dermoid measuring 2.7 cm.    IMPRESSION:  Jose  Andrea ID # [id number]  7 year old female from [facility] who moved to [place] in , PMH hypothyroidism, pregestational diabetes,  PPD#22 s/p primary   due to pre gestational diabetes admitted for post OP infection-intraabdominal abscess.    Intraabdominal collection:   -Right pyosalpinx/tubo-ovarian abscess with extensive surrounding phlegmonous change   -R post hemipelvis hemorrhage concerning for loculated hemorrhagic collections.  -BL hydronephrosis worse on R kidney  -Post surgical changes    PLAN:  -Please d/c vancomycin(BC NGTD)  -Continue Zosyn 3.375 g (4hrs infusion) q8 hrs IV  -Follow up cultures NGTD  -Trend ESR and CRP  -IR follow up for abscess drainage if amenable(obtain BF gram stain and cultures) however she might need additional surgical exploration for this R TOA and also the hydronephrosis is concerning since could be due to significant inflammation but it could be also 2/2 past surgery)  -F/up OB/GYN  -Discussed with OB PA  -Discussed in details and at length with the pt with help of the Jose     Please reach ID with any questions or concerns  Dr. Gretchen Navas  Tel. [phone]  Available in Teams

## 2022-10-06 NOTE — CHART NOTE - NSCHARTNOTEFT_GEN_A_CORE
[facility] FOLLOW UP NOTE     MRI resulted   Case d/w IR Dr. Mcdowell, pt to have IR drainage/aspiration in AM   case d/w with patient, verbalized understanding and agrees with plan   will keep NPO after Midnight   AM labs/coags ordered  pt seen with Dr. Sung

## 2022-10-07 LAB
ALBUMIN SERPL ELPH-MCNC: 2.3 G/DL — LOW (ref 3.5–5)
ALP SERPL-CCNC: 91 U/L — SIGNIFICANT CHANGE UP (ref 40–120)
ALT FLD-CCNC: 42 U/L DA — SIGNIFICANT CHANGE UP (ref 10–60)
ANION GAP SERPL CALC-SCNC: 8 MMOL/L — SIGNIFICANT CHANGE UP (ref 5–17)
APTT BLD: 30.4 SEC — SIGNIFICANT CHANGE UP (ref 27.5–35.5)
AST SERPL-CCNC: 18 U/L — SIGNIFICANT CHANGE UP (ref 10–40)
BASOPHILS # BLD AUTO: 0.02 K/UL — SIGNIFICANT CHANGE UP (ref 0–0.2)
BASOPHILS NFR BLD AUTO: 0.2 % — SIGNIFICANT CHANGE UP (ref 0–2)
BILIRUB SERPL-MCNC: 0.1 MG/DL — LOW (ref 0.2–1.2)
BUN SERPL-MCNC: 7 MG/DL — SIGNIFICANT CHANGE UP (ref 7–18)
C TRACH RRNA SPEC QL NAA+PROBE: SIGNIFICANT CHANGE UP
CALCIUM SERPL-MCNC: 8.7 MG/DL — SIGNIFICANT CHANGE UP (ref 8.4–10.5)
CHLORIDE SERPL-SCNC: 109 MMOL/L — HIGH (ref 96–108)
CO2 SERPL-SCNC: 23 MMOL/L — SIGNIFICANT CHANGE UP (ref 22–31)
CREAT SERPL-MCNC: 0.51 MG/DL — SIGNIFICANT CHANGE UP (ref 0.5–1.3)
CRP SERPL-MCNC: 68 MG/L — HIGH
EGFR: 123 ML/MIN/1.73M2 — SIGNIFICANT CHANGE UP
EOSINOPHIL # BLD AUTO: 0.2 K/UL — SIGNIFICANT CHANGE UP (ref 0–0.5)
EOSINOPHIL NFR BLD AUTO: 2 % — SIGNIFICANT CHANGE UP (ref 0–6)
ERYTHROCYTE [SEDIMENTATION RATE] IN BLOOD: 86 MM/HR — HIGH (ref 0–15)
FIBRINOGEN PPP-MCNC: 920 MG/DL — HIGH (ref 340–550)
GLUCOSE SERPL-MCNC: 122 MG/DL — HIGH (ref 70–99)
HCT VFR BLD CALC: 34.7 % — SIGNIFICANT CHANGE UP (ref 34.5–45)
HGB BLD-MCNC: 10.8 G/DL — LOW (ref 11.5–15.5)
IMM GRANULOCYTES NFR BLD AUTO: 0.5 % — SIGNIFICANT CHANGE UP (ref 0–0.9)
INR BLD: 1.16 RATIO — SIGNIFICANT CHANGE UP (ref 0.88–1.16)
LYMPHOCYTES # BLD AUTO: 1.43 K/UL — SIGNIFICANT CHANGE UP (ref 1–3.3)
LYMPHOCYTES # BLD AUTO: 14.4 % — SIGNIFICANT CHANGE UP (ref 13–44)
MCHC RBC-ENTMCNC: 26.6 PG — LOW (ref 27–34)
MCHC RBC-ENTMCNC: 31.1 GM/DL — LOW (ref 32–36)
MCV RBC AUTO: 85.5 FL — SIGNIFICANT CHANGE UP (ref 80–100)
MONOCYTES # BLD AUTO: 0.57 K/UL — SIGNIFICANT CHANGE UP (ref 0–0.9)
MONOCYTES NFR BLD AUTO: 5.8 % — SIGNIFICANT CHANGE UP (ref 2–14)
N GONORRHOEA RRNA SPEC QL NAA+PROBE: SIGNIFICANT CHANGE UP
NEUTROPHILS # BLD AUTO: 7.63 K/UL — HIGH (ref 1.8–7.4)
NEUTROPHILS NFR BLD AUTO: 77.1 % — HIGH (ref 43–77)
NRBC # BLD: 0 /100 WBCS — SIGNIFICANT CHANGE UP (ref 0–0)
PLATELET # BLD AUTO: 282 K/UL — SIGNIFICANT CHANGE UP (ref 150–400)
POTASSIUM SERPL-MCNC: 4 MMOL/L — SIGNIFICANT CHANGE UP (ref 3.5–5.3)
POTASSIUM SERPL-SCNC: 4 MMOL/L — SIGNIFICANT CHANGE UP (ref 3.5–5.3)
PROT SERPL-MCNC: 7.1 G/DL — SIGNIFICANT CHANGE UP (ref 6–8.3)
PROTHROM AB SERPL-ACNC: 13.8 SEC — HIGH (ref 10.5–13.4)
RBC # BLD: 4.06 M/UL — SIGNIFICANT CHANGE UP (ref 3.8–5.2)
RBC # FLD: 14.6 % — HIGH (ref 10.3–14.5)
SODIUM SERPL-SCNC: 140 MMOL/L — SIGNIFICANT CHANGE UP (ref 135–145)
SPECIMEN SOURCE: SIGNIFICANT CHANGE UP
WBC # BLD: 9.9 K/UL — SIGNIFICANT CHANGE UP (ref 3.8–10.5)
WBC # FLD AUTO: 9.9 K/UL — SIGNIFICANT CHANGE UP (ref 3.8–10.5)

## 2022-10-07 PROCEDURE — 10160 PNXR ASPIR ABSC HMTMA BULLA: CPT

## 2022-10-07 PROCEDURE — 77012 CT SCAN FOR NEEDLE BIOPSY: CPT | Mod: 26

## 2022-10-07 PROCEDURE — 99231 SBSQ HOSP IP/OBS SF/LOW 25: CPT

## 2022-10-07 RX ORDER — SODIUM CHLORIDE 9 MG/ML
1000 INJECTION, SOLUTION INTRAVENOUS
Refills: 0 | Status: DISCONTINUED | OUTPATIENT
Start: 2022-10-07 | End: 2022-10-07

## 2022-10-07 RX ORDER — ACETAMINOPHEN 500 MG
725 TABLET ORAL ONCE
Refills: 0 | Status: DISCONTINUED | OUTPATIENT
Start: 2022-10-07 | End: 2022-10-07

## 2022-10-07 RX ORDER — ONDANSETRON 8 MG/1
4 TABLET, FILM COATED ORAL ONCE
Refills: 0 | Status: DISCONTINUED | OUTPATIENT
Start: 2022-10-07 | End: 2022-10-07

## 2022-10-07 RX ORDER — FENTANYL CITRATE 50 UG/ML
25 INJECTION INTRAVENOUS
Refills: 0 | Status: DISCONTINUED | OUTPATIENT
Start: 2022-10-07 | End: 2022-10-07

## 2022-10-07 RX ORDER — FENTANYL CITRATE 50 UG/ML
50 INJECTION INTRAVENOUS
Refills: 0 | Status: DISCONTINUED | OUTPATIENT
Start: 2022-10-07 | End: 2022-10-07

## 2022-10-07 RX ADMIN — PIPERACILLIN AND TAZOBACTAM 25 GRAM(S): 4; .5 INJECTION, POWDER, LYOPHILIZED, FOR SOLUTION INTRAVENOUS at 23:35

## 2022-10-07 RX ADMIN — PIPERACILLIN AND TAZOBACTAM 25 GRAM(S): 4; .5 INJECTION, POWDER, LYOPHILIZED, FOR SOLUTION INTRAVENOUS at 00:32

## 2022-10-07 RX ADMIN — Medication 975 MILLIGRAM(S): at 20:33

## 2022-10-07 RX ADMIN — PIPERACILLIN AND TAZOBACTAM 25 GRAM(S): 4; .5 INJECTION, POWDER, LYOPHILIZED, FOR SOLUTION INTRAVENOUS at 07:56

## 2022-10-07 RX ADMIN — PIPERACILLIN AND TAZOBACTAM 25 GRAM(S): 4; .5 INJECTION, POWDER, LYOPHILIZED, FOR SOLUTION INTRAVENOUS at 16:02

## 2022-10-07 RX ADMIN — Medication 600 MILLIGRAM(S): at 20:33

## 2022-10-07 NOTE — PROCEDURE NOTE - PROCEDURE FINDINGS AND DETAILS
Small right lower quadrant/pelvic collection again identified, demonstrating interval decrease from prior imaging. 7 cc of yellow-green purulent fluid aspirated. Sample of fluid sent for culture.

## 2022-10-07 NOTE — PROCEDURE NOTE - PLAN
- Recovery x1 hour.  - F/u culture results.   - Global care as per primary team.     Official report to follow.

## 2022-10-07 NOTE — PRE PROCEDURE NOTE - PRE PROCEDURE EVALUATION
Interventional Radiology Pre-Procedure Note    Diagnosis/Indication: Patient is a 37y old Female with recent history of , complicated by several small pelvic collections as well as right pelvic collection thought to be a pyosalpinx/abscess. Aspiration with possible drainage of the collection was requested.     PAST MEDICAL & SURGICAL HISTORY:  Hypothyroid   delivery delivered       Allergies: No Known Allergies      LABS:  CBC Full  -  ( 07 Oct 2022 08:43 )  WBC Count : 9.90 K/uL  RBC Count : 4.06 M/uL  Hemoglobin : 10.8 g/dL  Hematocrit : 34.7 %  Platelet Count - Automated : 282 K/uL  Mean Cell Volume : 85.5 fl  Mean Cell Hemoglobin : 26.6 pg  Mean Cell Hemoglobin Concentration : 31.1 gm/dL  Auto Neutrophil # : 7.63 K/uL  Auto Lymphocyte # : 1.43 K/uL  Auto Monocyte # : 0.57 K/uL  Auto Eosinophil # : 0.20 K/uL  Auto Basophil # : 0.02 K/uL  Auto Neutrophil % : 77.1 %  Auto Lymphocyte % : 14.4 %  Auto Monocyte % : 5.8 %  Auto Eosinophil % : 2.0 %  Auto Basophil % : 0.2 %    10-07    140  |  109<H>  |  7   ----------------------------<  122<H>  4.0   |  23  |  0.51    Ca    8.7      07 Oct 2022 08:43    TPro  7.1  /  Alb  2.3<L>  /  TBili  0.1<L>  /  DBili  x   /  AST  18  /  ALT  42  /  AlkPhos  91  10    PT/INR - ( 07 Oct 2022 08:43 )   PT: 13.8 sec;   INR: 1.16 ratio       PTT - ( 07 Oct 2022 08:43 )  PTT:30.4 sec    Procedure/ risks/ benefits/ alternatives were discussed with the patient using the telephone  service, including but not limited to bleeding, infection and risk of injuring nearby structures. The patient verbalizes understanding, and witnessed informed consent was obtained.

## 2022-10-08 RX ORDER — ENOXAPARIN SODIUM 100 MG/ML
40 INJECTION SUBCUTANEOUS EVERY 24 HOURS
Refills: 0 | Status: DISCONTINUED | OUTPATIENT
Start: 2022-10-08 | End: 2022-10-12

## 2022-10-08 RX ADMIN — Medication 600 MILLIGRAM(S): at 17:09

## 2022-10-08 RX ADMIN — Medication 600 MILLIGRAM(S): at 18:00

## 2022-10-08 RX ADMIN — PIPERACILLIN AND TAZOBACTAM 25 GRAM(S): 4; .5 INJECTION, POWDER, LYOPHILIZED, FOR SOLUTION INTRAVENOUS at 17:08

## 2022-10-08 RX ADMIN — PIPERACILLIN AND TAZOBACTAM 25 GRAM(S): 4; .5 INJECTION, POWDER, LYOPHILIZED, FOR SOLUTION INTRAVENOUS at 08:52

## 2022-10-08 RX ADMIN — ENOXAPARIN SODIUM 40 MILLIGRAM(S): 100 INJECTION SUBCUTANEOUS at 06:52

## 2022-10-08 RX ADMIN — Medication 600 MILLIGRAM(S): at 08:51

## 2022-10-08 RX ADMIN — PIPERACILLIN AND TAZOBACTAM 25 GRAM(S): 4; .5 INJECTION, POWDER, LYOPHILIZED, FOR SOLUTION INTRAVENOUS at 23:20

## 2022-10-08 RX ADMIN — Medication 600 MILLIGRAM(S): at 09:50

## 2022-10-08 RX ADMIN — Medication 1 TABLET(S): at 17:09

## 2022-10-09 DIAGNOSIS — N73.9 FEMALE PELVIC INFLAMMATORY DISEASE, UNSPECIFIED: ICD-10-CM

## 2022-10-09 LAB
CRP SERPL-MCNC: 28 MG/L — HIGH
ERYTHROCYTE [SEDIMENTATION RATE] IN BLOOD: 85 MM/HR — HIGH (ref 0–15)
HCT VFR BLD CALC: 37.7 % — SIGNIFICANT CHANGE UP (ref 34.5–45)
HGB BLD-MCNC: 11.7 G/DL — SIGNIFICANT CHANGE UP (ref 11.5–15.5)
MCHC RBC-ENTMCNC: 26.8 PG — LOW (ref 27–34)
MCHC RBC-ENTMCNC: 31 GM/DL — LOW (ref 32–36)
MCV RBC AUTO: 86.3 FL — SIGNIFICANT CHANGE UP (ref 80–100)
NRBC # BLD: 0 /100 WBCS — SIGNIFICANT CHANGE UP (ref 0–0)
PLATELET # BLD AUTO: 470 K/UL — HIGH (ref 150–400)
RBC # BLD: 4.37 M/UL — SIGNIFICANT CHANGE UP (ref 3.8–5.2)
RBC # FLD: 14.5 % — SIGNIFICANT CHANGE UP (ref 10.3–14.5)
WBC # BLD: 6.5 K/UL — SIGNIFICANT CHANGE UP (ref 3.8–10.5)
WBC # FLD AUTO: 6.5 K/UL — SIGNIFICANT CHANGE UP (ref 3.8–10.5)

## 2022-10-09 RX ADMIN — PIPERACILLIN AND TAZOBACTAM 25 GRAM(S): 4; .5 INJECTION, POWDER, LYOPHILIZED, FOR SOLUTION INTRAVENOUS at 23:20

## 2022-10-09 RX ADMIN — PIPERACILLIN AND TAZOBACTAM 25 GRAM(S): 4; .5 INJECTION, POWDER, LYOPHILIZED, FOR SOLUTION INTRAVENOUS at 07:45

## 2022-10-09 RX ADMIN — Medication 1 TABLET(S): at 11:46

## 2022-10-09 RX ADMIN — PIPERACILLIN AND TAZOBACTAM 25 GRAM(S): 4; .5 INJECTION, POWDER, LYOPHILIZED, FOR SOLUTION INTRAVENOUS at 16:07

## 2022-10-09 RX ADMIN — ENOXAPARIN SODIUM 40 MILLIGRAM(S): 100 INJECTION SUBCUTANEOUS at 05:32

## 2022-10-09 NOTE — PROGRESS NOTE ADULT - ASSESSMENT
A/P: 37 year old  PPD#26 s/p primary  with fever and tachycardia, right pelvic collection, suspected pyosalpinx/abscess; stable, POD#2 s/p IR drainage

## 2022-10-10 LAB
ANION GAP SERPL CALC-SCNC: 13 MMOL/L — SIGNIFICANT CHANGE UP (ref 5–17)
BASOPHILS # BLD AUTO: 0.03 K/UL — SIGNIFICANT CHANGE UP (ref 0–0.2)
BASOPHILS NFR BLD AUTO: 0.4 % — SIGNIFICANT CHANGE UP (ref 0–2)
BUN SERPL-MCNC: 11 MG/DL — SIGNIFICANT CHANGE UP (ref 7–18)
CALCIUM SERPL-MCNC: 9.2 MG/DL — SIGNIFICANT CHANGE UP (ref 8.4–10.5)
CHLORIDE SERPL-SCNC: 105 MMOL/L — SIGNIFICANT CHANGE UP (ref 96–108)
CO2 SERPL-SCNC: 23 MMOL/L — SIGNIFICANT CHANGE UP (ref 22–31)
CREAT SERPL-MCNC: 0.66 MG/DL — SIGNIFICANT CHANGE UP (ref 0.5–1.3)
CRP SERPL-MCNC: 14 MG/L — HIGH
CULTURE RESULTS: SIGNIFICANT CHANGE UP
CULTURE RESULTS: SIGNIFICANT CHANGE UP
EGFR: 116 ML/MIN/1.73M2 — SIGNIFICANT CHANGE UP
EOSINOPHIL # BLD AUTO: 0.52 K/UL — HIGH (ref 0–0.5)
EOSINOPHIL NFR BLD AUTO: 7.7 % — HIGH (ref 0–6)
ERYTHROCYTE [SEDIMENTATION RATE] IN BLOOD: 97 MM/HR — HIGH (ref 0–15)
GLUCOSE SERPL-MCNC: 101 MG/DL — HIGH (ref 70–99)
HCT VFR BLD CALC: 37.5 % — SIGNIFICANT CHANGE UP (ref 34.5–45)
HGB BLD-MCNC: 11.7 G/DL — SIGNIFICANT CHANGE UP (ref 11.5–15.5)
IMM GRANULOCYTES NFR BLD AUTO: 1 % — HIGH (ref 0–0.9)
LYMPHOCYTES # BLD AUTO: 1.87 K/UL — SIGNIFICANT CHANGE UP (ref 1–3.3)
LYMPHOCYTES # BLD AUTO: 27.8 % — SIGNIFICANT CHANGE UP (ref 13–44)
MCHC RBC-ENTMCNC: 26.7 PG — LOW (ref 27–34)
MCHC RBC-ENTMCNC: 31.2 GM/DL — LOW (ref 32–36)
MCV RBC AUTO: 85.6 FL — SIGNIFICANT CHANGE UP (ref 80–100)
MONOCYTES # BLD AUTO: 0.47 K/UL — SIGNIFICANT CHANGE UP (ref 0–0.9)
MONOCYTES NFR BLD AUTO: 7 % — SIGNIFICANT CHANGE UP (ref 2–14)
NEUTROPHILS # BLD AUTO: 3.76 K/UL — SIGNIFICANT CHANGE UP (ref 1.8–7.4)
NEUTROPHILS NFR BLD AUTO: 56.1 % — SIGNIFICANT CHANGE UP (ref 43–77)
NRBC # BLD: 0 /100 WBCS — SIGNIFICANT CHANGE UP (ref 0–0)
PLATELET # BLD AUTO: 481 K/UL — HIGH (ref 150–400)
POTASSIUM SERPL-MCNC: 3.8 MMOL/L — SIGNIFICANT CHANGE UP (ref 3.5–5.3)
POTASSIUM SERPL-SCNC: 3.8 MMOL/L — SIGNIFICANT CHANGE UP (ref 3.5–5.3)
RBC # BLD: 4.38 M/UL — SIGNIFICANT CHANGE UP (ref 3.8–5.2)
RBC # FLD: 14.2 % — SIGNIFICANT CHANGE UP (ref 10.3–14.5)
SODIUM SERPL-SCNC: 141 MMOL/L — SIGNIFICANT CHANGE UP (ref 135–145)
SPECIMEN SOURCE: SIGNIFICANT CHANGE UP
SPECIMEN SOURCE: SIGNIFICANT CHANGE UP
WBC # BLD: 6.72 K/UL — SIGNIFICANT CHANGE UP (ref 3.8–10.5)
WBC # FLD AUTO: 6.72 K/UL — SIGNIFICANT CHANGE UP (ref 3.8–10.5)

## 2022-10-10 PROCEDURE — 99232 SBSQ HOSP IP/OBS MODERATE 35: CPT

## 2022-10-10 RX ADMIN — Medication 1 TABLET(S): at 11:18

## 2022-10-10 RX ADMIN — PIPERACILLIN AND TAZOBACTAM 25 GRAM(S): 4; .5 INJECTION, POWDER, LYOPHILIZED, FOR SOLUTION INTRAVENOUS at 23:53

## 2022-10-10 RX ADMIN — ENOXAPARIN SODIUM 40 MILLIGRAM(S): 100 INJECTION SUBCUTANEOUS at 06:08

## 2022-10-10 RX ADMIN — PIPERACILLIN AND TAZOBACTAM 25 GRAM(S): 4; .5 INJECTION, POWDER, LYOPHILIZED, FOR SOLUTION INTRAVENOUS at 16:33

## 2022-10-10 RX ADMIN — PIPERACILLIN AND TAZOBACTAM 25 GRAM(S): 4; .5 INJECTION, POWDER, LYOPHILIZED, FOR SOLUTION INTRAVENOUS at 07:20

## 2022-10-10 NOTE — PROGRESS NOTE ADULT - ASSESSMENT
Jose interpreted ID # [id number] Babu  Subjective: NAD, afebrile, feeling much better, no abdominal pain, no N/V or diarrhea, still feeling a little weak but better overall. Pumping her breast q/2hrs, no breast pain .    REVIEW OF SYSTEMS:  CONSTITUTIONAL:  mild weakness, fevers or chills  EYES/ENT:  No visual changes;  No vertigo or throat pain   NECK:  No pain or stiffness  RESPIRATORY:  No cough, wheezing, hemoptysis; No shortness of breath  CARDIOVASCULAR:  No chest pain or palpitations  GASTROINTESTINAL:  No abdominal or epigastric pain. No nausea, vomiting, or hematemesis; No diarrhea or constipation. No melena or hematochezia.  GENITOURINARY:  No dysuria, frequency or hematuria  NEUROLOGICAL:  No numbness or weakness  MSK: no back pain, no joint pain  SKIN:  No itching, rashes    PHYSICAL EXAM:   Vital Signs Last 24 Hrs  T(C): 36.7 (10 Oct 2022 13:04), Max: 36.8 (10 Oct 2022 05:32)  T(F): 98 (10 Oct 2022 13:04), Max: 98.2 (10 Oct 2022 05:32)  HR: 79 (10 Oct 2022 13:04) (79 - 85)  BP: 99/65 (10 Oct 2022 13:04) (93/55 - 99/65)  BP(mean): 64 (09 Oct 2022 20:27) (64 - 64)  RR: 16 (10 Oct 2022 13:04) (16 - 18)  SpO2: 99% (10 Oct 2022 13:04) (99% - 100%)    Parameters below as of 10 Oct 2022 13:04 Patient On (Oxygen Delivery Method): room air    GENERAL: NAD, non-toxic, pleasant, lying in bed comfortably  HEAD:  Atraumatic, Normocephalic  EYES: EOMI, PERRLA, conjunctiva and sclera clear  ENT: Moist mucous membranes  NECK: Supple, No JVD  CHEST/LUNG: Clear to auscultation bilaterally; No rales, rhonchi, wheezing, or rubs. Unlabored respirations  HEART: Regular rate and rhythm; No murmurs, rubs, or gallops  ABDOMEN: BSx4; Soft, nontender, mild discomfort on deep palpation RLQ, nondistended  : no dysuria  EXTREMITIES:  2+ Peripheral Pulses, brisk capillary refill. No clubbing, cyanosis, or edema  NERVOUS SYSTEM:  A&Ox3. No sensory deficits, no motor deficits  SKIN: No rashes or lesions, dry and warm skin  MSK: no back pain, no joint swelling    LABS/DIAGNOSTIC TESTS:                        11.7   6.72  )-----------( 481      ( 10 Oct 2022 06:28 )             37.5     WBC Count: 6.72 K/uL (10-10 @ 06:28)  WBC Count: 6.50 K/uL (10-09 @ 07:19)    10-10    141  |  105  |  11  ----------------------------<  101<H>  3.8   |  23  |  0.66    Ca    9.2      10 Oct 2022 06:28    CULTURES:     Culture - Abscess with Gram Stain (collected 10-07-22 @ 16:18)  Source: .Abscess pelvic abscess  Preliminary Report (10-09-22 @ 19:48):    Numerous Streptococcus anginosus "Susceptibilities not performed"    Culture - Urine (collected 10-04-22 @ 20:20)  Source: Clean Catch Clean Catch (Midstream)  Final Report (10-06-22 @ 13:21):    <10,000 CFU/mL Normal Urogenital Emi    Culture - Blood (collected 10-04-22 @ 20:20)  Source: .Blood Blood-Peripheral  Final Report (10-10-22 @ 04:00):    No Growth Final    Culture - Blood (collected 10-04-22 @ 20:15)  Source: .Blood Blood-Peripheral  Final Report (10-10-22 @ 04:00):    No Growth Final    Rapid RVP Result: NotDetec (10-04 @ 21:00)    RADIOLOGY: < from: CT Aspiration Abscess Hematoma, Cyst (10.07.22 @ 16:21) >  CT imagesand aspiration of 7 mL of   yellow-green purulent fluid. A sample of the fluid was sent for   microbiological analysis. The needle was removed and hemostasis was   achieved with manual compression. Repeat images demonstrated interval   decrease in size of the abscess with no evidence of acute complications.     ANTIBIOTICS:  piperacillin/tazobactam IVPB.. 3.375 every 8 hours    IV LINES:    IMPRESSION AND PLAN:   37 year old female from [place] who moved to [place] in , PMH hypothyroidism, pregestational diabetes,  PPD#22 s/p primary   due to pre gestational diabetes admitted for post OP infection-intraabdominal abscess.    Intraabdominal collection:   -Right pyosalpinx/tubo-ovarian abscess with extensive surrounding phlegmonous change   -R post hemipelvis hemorrhage concerning for loculated hemorrhagic collections.  -BL hydronephrosis worse on R kidney  -Post surgical changes      Please reach ID with any questions or concerns  Dr. Gretchen Navas  Tel. [phone]  Available in Teams               Jose interpreted ID # [id number] Babu  Subjective: NAD, afebrile, feeling much better, no abdominal pain, no N/V or diarrhea, still feeling a little weak but better overall. Pumping her breast q/2hrs, no breast pain .    REVIEW OF SYSTEMS:  CONSTITUTIONAL:  mild weakness, fevers or chills  EYES/ENT:  No visual changes;  No vertigo or throat pain   NECK:  No pain or stiffness  RESPIRATORY:  No cough, wheezing, hemoptysis; No shortness of breath  CARDIOVASCULAR:  No chest pain or palpitations  GASTROINTESTINAL:  No abdominal or epigastric pain. No nausea, vomiting, or hematemesis; No diarrhea or constipation. No melena or hematochezia.  GENITOURINARY:  No dysuria, frequency or hematuria  NEUROLOGICAL:  No numbness or weakness  MSK: no back pain, no joint pain  SKIN:  No itching, rashes    PHYSICAL EXAM:   Vital Signs Last 24 Hrs  T(C): 36.7 (10 Oct 2022 13:04), Max: 36.8 (10 Oct 2022 05:32)  T(F): 98 (10 Oct 2022 13:04), Max: 98.2 (10 Oct 2022 05:32)  HR: 79 (10 Oct 2022 13:04) (79 - 85)  BP: 99/65 (10 Oct 2022 13:04) (93/55 - 99/65)  BP(mean): 64 (09 Oct 2022 20:27) (64 - 64)  RR: 16 (10 Oct 2022 13:04) (16 - 18)  SpO2: 99% (10 Oct 2022 13:04) (99% - 100%)    Parameters below as of 10 Oct 2022 13:04 Patient On (Oxygen Delivery Method): room air    GENERAL: NAD, non-toxic, pleasant, lying in bed comfortably  HEAD:  Atraumatic, Normocephalic  EYES: EOMI, PERRLA, conjunctiva and sclera clear  ENT: Moist mucous membranes  NECK: Supple, No JVD  CHEST/LUNG: Clear to auscultation bilaterally; No rales, rhonchi, wheezing, or rubs. Unlabored respirations  HEART: Regular rate and rhythm; No murmurs, rubs, or gallops  ABDOMEN: BSx4; Soft, nontender, mild discomfort on deep palpation RLQ, nondistended  : no dysuria  EXTREMITIES:  2+ Peripheral Pulses, brisk capillary refill. No clubbing, cyanosis, or edema  NERVOUS SYSTEM:  A&Ox3. No sensory deficits, no motor deficits  SKIN: No rashes or lesions, dry and warm skin  MSK: no back pain, no joint swelling    LABS/DIAGNOSTIC TESTS:                        11.7   6.72  )-----------( 481      ( 10 Oct 2022 06:28 )             37.5     WBC Count: 6.72 K/uL (10-10 @ 06:28)  WBC Count: 6.50 K/uL (10-09 @ 07:19)    10-10    141  |  105  |  11  ----------------------------<  101<H>  3.8   |  23  |  0.66    Ca    9.2      10 Oct 2022 06:28    CULTURES:     Culture - Abscess with Gram Stain (collected 10-07-22 @ 16:18)  Source: .Abscess pelvic abscess  Preliminary Report (10-09-22 @ 19:48):    Numerous Streptococcus anginosus "Susceptibilities not performed"    Culture - Urine (collected 10-04-22 @ 20:20)  Source: Clean Catch Clean Catch (Midstream)  Final Report (10-06-22 @ 13:21):    <10,000 CFU/mL Normal Urogenital Emi    Culture - Blood (collected 10-04-22 @ 20:20)  Source: .Blood Blood-Peripheral  Final Report (10-10-22 @ 04:00):    No Growth Final    Culture - Blood (collected 10-04-22 @ 20:15)  Source: .Blood Blood-Peripheral  Final Report (10-10-22 @ 04:00):    No Growth Final    Rapid RVP Result: NotDetec (10-04 @ 21:00)    RADIOLOGY: < from: CT Aspiration Abscess Hematoma, Cyst (10.07.22 @ 16:21) >  CT imagesand aspiration of 7 mL of   yellow-green purulent fluid. A sample of the fluid was sent for   microbiological analysis. The needle was removed and hemostasis was   achieved with manual compression. Repeat images demonstrated interval   decrease in size of the abscess with no evidence of acute complications.     ANTIBIOTICS:  piperacillin/tazobactam IVPB.. 3.375 every 8 hours    IV LINES:    IMPRESSION AND PLAN:   37 year old female from [place] who moved to [place] in , PMH hypothyroidism, pregestational diabetes,  PPD#22 s/p primary   due to pre gestational diabetes admitted for post OP infection-intraabdominal abscess.  C-Reactive Protein, Serum: 14 mg/L (10-10-22 @ 06:28) from C-Reactive Protein, Serum: 104 mg/L (10-05-22 @ 22:40)  Sedimentation Rate, Erythrocyte: 97 mm/Hr (10.10.22 @ 06:28)    Intraabdominal collection:   -Right pyosalpinx/tubo-ovarian abscess with extensive surrounding phlegmonous change   -R post hemipelvis hemorrhage concerning for loculated hemorrhagic collections.  -BL hydronephrosis worse on R kidney  -Post surgical changes    -Can transition zosyn to ceftriaxone 2g q/24 hrs IV for total 4 weeks of therapy(10/07- ) however duration will depend on clinical improvement, repeat imaging, normalizing of inflammatory markers)  -Will need repeat CT A/P in 2 weeks  -Labs while on abx CBC w/diff, CMP, ESR and CRP  -F/up OB/GYN outpatient    Please reach ID with any questions or concerns  Dr. Gretchen Navas  Tel. [phone]  Available in Teams               Jose interpreted ID # [id number] Babu  Subjective: NAD, afebrile, feeling much better, no abdominal pain, no N/V or diarrhea, still feeling a little weak but better overall. Pumping her breast q/2hrs, no breast pain .    REVIEW OF SYSTEMS:  CONSTITUTIONAL:  mild weakness, fevers or chills  EYES/ENT:  No visual changes;  No vertigo or throat pain   NECK:  No pain or stiffness  RESPIRATORY:  No cough, wheezing, hemoptysis; No shortness of breath  CARDIOVASCULAR:  No chest pain or palpitations  GASTROINTESTINAL:  No abdominal or epigastric pain. No nausea, vomiting, or hematemesis; No diarrhea or constipation. No melena or hematochezia.  GENITOURINARY:  No dysuria, frequency or hematuria  NEUROLOGICAL:  No numbness or weakness  MSK: no back pain, no joint pain  SKIN:  No itching, rashes    PHYSICAL EXAM:   Vital Signs Last 24 Hrs  T(C): 36.7 (10 Oct 2022 13:04), Max: 36.8 (10 Oct 2022 05:32)  T(F): 98 (10 Oct 2022 13:04), Max: 98.2 (10 Oct 2022 05:32)  HR: 79 (10 Oct 2022 13:04) (79 - 85)  BP: 99/65 (10 Oct 2022 13:04) (93/55 - 99/65)  BP(mean): 64 (09 Oct 2022 20:27) (64 - 64)  RR: 16 (10 Oct 2022 13:04) (16 - 18)  SpO2: 99% (10 Oct 2022 13:04) (99% - 100%)    Parameters below as of 10 Oct 2022 13:04 Patient On (Oxygen Delivery Method): room air    GENERAL: NAD, non-toxic, pleasant, lying in bed comfortably  HEAD:  Atraumatic, Normocephalic  EYES: EOMI, PERRLA, conjunctiva and sclera clear  ENT: Moist mucous membranes  NECK: Supple, No JVD  CHEST/LUNG: Clear to auscultation bilaterally; No rales, rhonchi, wheezing, or rubs. Unlabored respirations  HEART: Regular rate and rhythm; No murmurs, rubs, or gallops  ABDOMEN: BSx4; Soft, nontender, mild discomfort on deep palpation RLQ, nondistended  : no dysuria  EXTREMITIES:  2+ Peripheral Pulses, brisk capillary refill. No clubbing, cyanosis, or edema  NERVOUS SYSTEM:  A&Ox3. No sensory deficits, no motor deficits  SKIN: No rashes or lesions, dry and warm skin  MSK: no back pain, no joint swelling    LABS/DIAGNOSTIC TESTS:                        11.7   6.72  )-----------( 481      ( 10 Oct 2022 06:28 )             37.5     WBC Count: 6.72 K/uL (10-10 @ 06:28)  WBC Count: 6.50 K/uL (10-09 @ 07:19)    10-10    141  |  105  |  11  ----------------------------<  101<H>  3.8   |  23  |  0.66    Ca    9.2      10 Oct 2022 06:28    CULTURES:     Culture - Abscess with Gram Stain (collected 10-07-22 @ 16:18)  Source: .Abscess pelvic abscess  Preliminary Report (10-09-22 @ 19:48):    Numerous Streptococcus anginosus "Susceptibilities not performed"    Culture - Urine (collected 10-04-22 @ 20:20)  Source: Clean Catch Clean Catch (Midstream)  Final Report (10-06-22 @ 13:21):    <10,000 CFU/mL Normal Urogenital Emi    Culture - Blood (collected 10-04-22 @ 20:20)  Source: .Blood Blood-Peripheral  Final Report (10-10-22 @ 04:00):    No Growth Final    Culture - Blood (collected 10-04-22 @ 20:15)  Source: .Blood Blood-Peripheral  Final Report (10-10-22 @ 04:00):    No Growth Final    Rapid RVP Result: NotDetec (10-04 @ 21:00)    RADIOLOGY: < from: CT Aspiration Abscess Hematoma, Cyst (10.07.22 @ 16:21) >  CT imagesand aspiration of 7 mL of   yellow-green purulent fluid. A sample of the fluid was sent for   microbiological analysis. The needle was removed and hemostasis was   achieved with manual compression. Repeat images demonstrated interval   decrease in size of the abscess with no evidence of acute complications.     ANTIBIOTICS:  piperacillin/tazobactam IVPB.. 3.375 every 8 hours    IV LINES:    IMPRESSION AND PLAN:   37 year old female from [place] who moved to [place] in , PMH hypothyroidism, pregestational diabetes,  PPD#22 s/p primary   due to pre gestational diabetes admitted for post OP infection-intraabdominal abscess.  C-Reactive Protein, Serum: 14 mg/L (10-10-22 @ 06:28) from C-Reactive Protein, Serum: 104 mg/L (10-05-22 @ 22:40)  Sedimentation Rate, Erythrocyte: 97 mm/Hr (10.10.22 @ 06:28)    Intraabdominal collection:   -Right pyosalpinx/tubo-ovarian abscess with extensive surrounding phlegmonous change   -R post hemipelvis hemorrhage concerning for loculated hemorrhagic collections.  -BL hydronephrosis worse on R kidney  -Post surgical changes    -Can transition zosyn to ceftriaxone 2g q/24 hrs IV for total 4 weeks of therapy(10/07- ) however duration will depend on clinical improvement, repeat imaging, normalizing of inflammatory markers)  -Will need repeat CT A/P in 2 weeks to assess resolution of abscess/evaluation of hydronephrosis  -Labs while on abx CBC w/diff, CMP, ESR and CRP  -F/up OB/GYN outpatient and PMD  -The plan has been discussed at length with PA  -Discussed with PA, PICC line to be placed for IV therapy  -ID will sign off    Please reach ID with any questions or concerns  Dr. Gretchen Navas  Tel. [phone]  Available in Teams

## 2022-10-11 LAB — SARS-COV-2 RNA SPEC QL NAA+PROBE: SIGNIFICANT CHANGE UP

## 2022-10-11 PROCEDURE — 36573 INSJ PICC RS&I 5 YR+: CPT | Mod: 78

## 2022-10-11 PROCEDURE — 36573 INSJ PICC RS&I 5 YR+: CPT | Mod: 59,LT

## 2022-10-11 RX ORDER — VITAMIN E 100 UNIT
1 CAPSULE ORAL
Qty: 30 | Refills: 0
Start: 2022-10-11 | End: 2022-11-09

## 2022-10-11 RX ORDER — CEFTRIAXONE 500 MG/1
2 INJECTION, POWDER, FOR SOLUTION INTRAMUSCULAR; INTRAVENOUS
Qty: 60 | Refills: 0
Start: 2022-10-11 | End: 2022-11-09

## 2022-10-11 RX ORDER — CHLORHEXIDINE GLUCONATE 213 G/1000ML
1 SOLUTION TOPICAL DAILY
Refills: 0 | Status: DISCONTINUED | OUTPATIENT
Start: 2022-10-12 | End: 2022-10-12

## 2022-10-11 RX ORDER — SODIUM CHLORIDE 9 MG/ML
10 INJECTION INTRAMUSCULAR; INTRAVENOUS; SUBCUTANEOUS
Refills: 0 | Status: DISCONTINUED | OUTPATIENT
Start: 2022-10-11 | End: 2022-10-12

## 2022-10-11 RX ORDER — IBUPROFEN 200 MG
1 TABLET ORAL
Qty: 120 | Refills: 0
Start: 2022-10-11 | End: 2022-11-09

## 2022-10-11 RX ADMIN — Medication 1 TABLET(S): at 17:08

## 2022-10-11 RX ADMIN — PIPERACILLIN AND TAZOBACTAM 25 GRAM(S): 4; .5 INJECTION, POWDER, LYOPHILIZED, FOR SOLUTION INTRAVENOUS at 23:28

## 2022-10-11 RX ADMIN — ENOXAPARIN SODIUM 40 MILLIGRAM(S): 100 INJECTION SUBCUTANEOUS at 06:03

## 2022-10-11 RX ADMIN — PIPERACILLIN AND TAZOBACTAM 25 GRAM(S): 4; .5 INJECTION, POWDER, LYOPHILIZED, FOR SOLUTION INTRAVENOUS at 17:07

## 2022-10-11 RX ADMIN — PIPERACILLIN AND TAZOBACTAM 25 GRAM(S): 4; .5 INJECTION, POWDER, LYOPHILIZED, FOR SOLUTION INTRAVENOUS at 09:11

## 2022-10-11 NOTE — DISCHARGE NOTE PROVIDER - HOSPITAL COURSE
Pt readmitted to hospital POst-op day 25 for fever , breast engorgement and found to have a pelvic abscess.   Pt received Iv antibiotics and IR drainage and was discharged with continued IV antibiotics for a month.

## 2022-10-11 NOTE — PROCEDURE NOTE - NSINFORMCONSENT_GEN_A_CORE
Benefits, risks, and possible complications of procedure explained to patient/caregiver who verbalized understanding and gave written consent.
Language line- Serbian/Benefits, risks, and possible complications of procedure explained to patient/caregiver who verbalized understanding and gave written consent.

## 2022-10-11 NOTE — DISCHARGE NOTE PROVIDER - CARE PROVIDER_API CALL
Eleanor Deleon  [facility]ICS [facility] G[facility]  [address] [place] [address]  Phone: [phone]  Fax: [phone]  Established Patient  Follow Up Time: 2 weeks

## 2022-10-11 NOTE — DISCHARGE NOTE PROVIDER - NSDCMRMEDTOKEN_GEN_ALL_CORE_FT
cefTRIAXone 2 g/50 mL-iso-osmotic dextrose intravenous solution: 2 gram(s) intravenously once a day MDD:1  Colace 2-in-1 50 mg-8.6 mg oral tablet: 2 tab(s) orally once a day (at bedtime)   ferrous sulfate 325 mg (65 mg elemental iron) oral tablet: 1 tab(s) orally 2 times a day   ibuprofen 600 mg oral tablet: 1 tab(s) orally every 6 hours   Prenatal Multivitamins with Folic Acid 1 mg oral tablet: 1 tab(s) orally once a day  Vitamin C 500 mg oral tablet: 1 tab(s) orally once a day   vitamin E 100 intl units oral capsule: 1 cap(s) orally once a day , apply to incision daily MDD:1   cefTRIAXone 2 g/50 mL-iso-osmotic dextrose intravenous solution: 2 gram(s) intravenously once a day MDD:1  vitamin E 100 intl units oral capsule: 1 cap(s) orally once a day , apply to incision daily MDD:1

## 2022-10-11 NOTE — PROCEDURE NOTE - ADDITIONAL PROCEDURE DETAILS
35 cm 4Fr  PICC inserted via the left basilic vein.  Sterile dressing applied.  Tip location SVC/ RA Junction.

## 2022-10-11 NOTE — DISCHARGE NOTE PROVIDER - NSDCCPCAREPLAN_GEN_ALL_CORE_FT
PRINCIPAL DISCHARGE DIAGNOSIS  Diagnosis: Complicated abscess  Assessment and Plan of Treatment: iv antibiotics, drainage

## 2022-10-12 VITALS
RESPIRATION RATE: 16 BRPM | HEART RATE: 86 BPM | OXYGEN SATURATION: 98 % | TEMPERATURE: 98 F | DIASTOLIC BLOOD PRESSURE: 70 MMHG | SYSTOLIC BLOOD PRESSURE: 98 MMHG

## 2022-10-12 LAB
CULTURE RESULTS: SIGNIFICANT CHANGE UP
MRSA PCR RESULT.: SIGNIFICANT CHANGE UP
S AUREUS DNA NOSE QL NAA+PROBE: SIGNIFICANT CHANGE UP
SPECIMEN SOURCE: SIGNIFICANT CHANGE UP

## 2022-10-12 PROCEDURE — 87040 BLOOD CULTURE FOR BACTERIA: CPT

## 2022-10-12 PROCEDURE — 85652 RBC SED RATE AUTOMATED: CPT

## 2022-10-12 PROCEDURE — 72196 MRI PELVIS W/DYE: CPT

## 2022-10-12 PROCEDURE — 85027 COMPLETE CBC AUTOMATED: CPT

## 2022-10-12 PROCEDURE — 77001 FLUOROGUIDE FOR VEIN DEVICE: CPT

## 2022-10-12 PROCEDURE — 80053 COMPREHEN METABOLIC PANEL: CPT

## 2022-10-12 PROCEDURE — 84702 CHORIONIC GONADOTROPIN TEST: CPT

## 2022-10-12 PROCEDURE — 87070 CULTURE OTHR SPECIMN AEROBIC: CPT

## 2022-10-12 PROCEDURE — C1769: CPT

## 2022-10-12 PROCEDURE — 87491 CHLMYD TRACH DNA AMP PROBE: CPT

## 2022-10-12 PROCEDURE — U0005: CPT

## 2022-10-12 PROCEDURE — 87640 STAPH A DNA AMP PROBE: CPT

## 2022-10-12 PROCEDURE — 71275 CT ANGIOGRAPHY CHEST: CPT | Mod: MA

## 2022-10-12 PROCEDURE — 87641 MR-STAPH DNA AMP PROBE: CPT

## 2022-10-12 PROCEDURE — 83605 ASSAY OF LACTIC ACID: CPT

## 2022-10-12 PROCEDURE — 85025 COMPLETE CBC W/AUTO DIFF WBC: CPT

## 2022-10-12 PROCEDURE — 36415 COLL VENOUS BLD VENIPUNCTURE: CPT

## 2022-10-12 PROCEDURE — A9585: CPT

## 2022-10-12 PROCEDURE — 77012 CT SCAN FOR NEEDLE BIOPSY: CPT

## 2022-10-12 PROCEDURE — 87635 SARS-COV-2 COVID-19 AMP PRB: CPT

## 2022-10-12 PROCEDURE — 87077 CULTURE AEROBIC IDENTIFY: CPT

## 2022-10-12 PROCEDURE — 85384 FIBRINOGEN ACTIVITY: CPT

## 2022-10-12 PROCEDURE — 85610 PROTHROMBIN TIME: CPT

## 2022-10-12 PROCEDURE — 93005 ELECTROCARDIOGRAM TRACING: CPT

## 2022-10-12 PROCEDURE — 87086 URINE CULTURE/COLONY COUNT: CPT

## 2022-10-12 PROCEDURE — 96374 THER/PROPH/DIAG INJ IV PUSH: CPT

## 2022-10-12 PROCEDURE — 0225U NFCT DS DNA&RNA 21 SARSCOV2: CPT

## 2022-10-12 PROCEDURE — 86140 C-REACTIVE PROTEIN: CPT

## 2022-10-12 PROCEDURE — 87591 N.GONORRHOEAE DNA AMP PROB: CPT

## 2022-10-12 PROCEDURE — 82962 GLUCOSE BLOOD TEST: CPT

## 2022-10-12 PROCEDURE — 85730 THROMBOPLASTIN TIME PARTIAL: CPT

## 2022-10-12 PROCEDURE — 82308 ASSAY OF CALCITONIN: CPT

## 2022-10-12 PROCEDURE — 76937 US GUIDE VASCULAR ACCESS: CPT

## 2022-10-12 PROCEDURE — 87205 SMEAR GRAM STAIN: CPT

## 2022-10-12 PROCEDURE — 99285 EMERGENCY DEPT VISIT HI MDM: CPT

## 2022-10-12 PROCEDURE — 74177 CT ABD & PELVIS W/CONTRAST: CPT | Mod: MA

## 2022-10-12 PROCEDURE — C1751: CPT

## 2022-10-12 PROCEDURE — 81001 URINALYSIS AUTO W/SCOPE: CPT

## 2022-10-12 PROCEDURE — 80048 BASIC METABOLIC PNL TOTAL CA: CPT

## 2022-10-12 PROCEDURE — 10160 PNXR ASPIR ABSC HMTMA BULLA: CPT

## 2022-10-12 PROCEDURE — 36573 INSJ PICC RS&I 5 YR+: CPT

## 2022-10-12 PROCEDURE — 84484 ASSAY OF TROPONIN QUANT: CPT

## 2022-10-12 PROCEDURE — U0003: CPT

## 2022-10-12 RX ORDER — CEFTRIAXONE 500 MG/1
2000 INJECTION, POWDER, FOR SOLUTION INTRAMUSCULAR; INTRAVENOUS ONCE
Refills: 0 | Status: COMPLETED | OUTPATIENT
Start: 2022-10-12 | End: 2022-10-12

## 2022-10-12 RX ADMIN — Medication 1 TABLET(S): at 12:15

## 2022-10-12 RX ADMIN — CHLORHEXIDINE GLUCONATE 1 APPLICATION(S): 213 SOLUTION TOPICAL at 12:15

## 2022-10-12 RX ADMIN — CEFTRIAXONE 100 MILLIGRAM(S): 500 INJECTION, POWDER, FOR SOLUTION INTRAMUSCULAR; INTRAVENOUS at 11:08

## 2022-10-12 RX ADMIN — ENOXAPARIN SODIUM 40 MILLIGRAM(S): 100 INJECTION SUBCUTANEOUS at 05:31

## 2022-10-12 NOTE — PROGRESS NOTE ADULT - ASSESSMENT
38yo  readmission for postop fever due to suspected pelvic abscess/pyosalpinx HD#8, POD#28; s/p IR drainage 10/7 and PICC line placement 10/11/22.

## 2022-10-12 NOTE — PROGRESS NOTE ADULT - PROBLEM SELECTOR PLAN 1
- ID consult appreciated, continue zosyn/vanco  - crp, sed rate, calcitonin resulted   - f/u Blood cultures, urine culture   - GC/Chlamydia ordered   - IR consulted, recommend MRI for visualization f/u official read  - Pain management prn  - regular diet  - manual breast pump given, lactation consultant spoke with patient   - OOB and ambulate  - d/w Dr. Sung, house attending
s/p IR drainage 10/7 and PICC line placement 10/11/22  s/p Zosyn- currently on cefriaxone 2g Q24hrs and to continue with via PICC line and home care x 4weeks  Case mgmt arranged and home care to start tomorrow pending planned discharge today.  Infectious disease recommending rpt CT A/P in 2 weeks and continue IV antibiotics with routine labs (cbc/cmp/esr/crp)  pain mgmt as needed  OOB and ambulation encouraged  continue breastpumping  dc home today pending completion of case mgmt
- f/u IR drainage cultures - no growth   - ID consult appreciated, continue zosyn  - f/u am cbc/bp/esr/crp   - f/u Blood cultures, urine culture - no growth to date   - GC/Chlamydia negative    - Pain management prn  - regular diet  - encourage breast pumping   - OOB and ambulate  - d/w Dr. Rocha, house attending

## 2022-10-12 NOTE — PROGRESS NOTE ADULT - PROBLEM SELECTOR PLAN 2
s/p IR drainage 10/7 and PICC line placement 10/11/22  s/p Zosyn- currently on cefriaxone 2g Q24hrs and to continue with via PICC line and home care x 4weeks  Case mgmt arranged and home care to start tomorrow pending planned discharge today.  Infectious disease recommending rpt CT A/P in 2 weeks and continue IV antibiotics with routine labs (cbc/cmp/esr/crp)  pain mgmt as needed  OOB and ambulation encouraged  continue breastpumping  dc home today pending completion of case mgmt
- f/u IR drainage cultures - no growth   - ID consult appreciated, continue zosyn  - f/u am cbc/bp/esr/crp   - f/u Blood cultures, urine culture - no growth to date   - GC/Chlamydia negative    - Pain management prn  - regular diet  - encourage breast pumping   - OOB and ambulate  - d/w Dr. Rocha, house attending

## 2022-10-12 NOTE — PROGRESS NOTE ADULT - SUBJECTIVE AND OBJECTIVE BOX
PA NOTE:    Patient seen at bedisde resting comfortably offers no new complaints of pain, but feels warm at night.  + Ambulation, + void without difficulty, + flatus, + BM; tolerating regular diet. both breastpumping. Denies HA, CP, SOB, N/V/D,  no bm; dizziness, palpitations, worsening abdominal pain, worsening vaginal bleeding, or any other concerns.     Vital Signs Last 24 Hrs  T(C): 36.8 (10 Oct 2022 05:32), Max: 36.8 (10 Oct 2022 05:32)  T(F): 98.2 (10 Oct 2022 05:32), Max: 98.2 (10 Oct 2022 05:32)  HR: 85 (10 Oct 2022 05:32) (83 - 90)  BP: 97/66 (10 Oct 2022 05:32) (93/55 - 98/64)  BP(mean): 64 (09 Oct 2022 20:27) (64 - 64)  RR: 17 (10 Oct 2022 05:32) (17 - 18)  SpO2: 100% (10 Oct 2022 05:32) (96% - 100%)    Parameters below as of 10 Oct 2022 05:32  Patient On (Oxygen Delivery Method): room air        Gen: A&O x 3, NAD  Chest: CTABL  Cardiac: S1+S2+ RRR  Breast: Soft, nontender, nonengorged  Abdomen: +BS; soft; Nontender, nondistended, Incision C/D/I healed.    Gyn: Minimal lochia  Extremities: Nontender, DTRS 2+, no worsening edema                          11.7   6.72  )-----------( 481      ( 10 Oct 2022 06:28 )               37.5     blood/urine culture final - negative growth.     abscess culture- streptococcus anginosus.       A/P: POD #27 s/p c/s s/p IR drainage of abscess  Afebrile x 5 days. last LGT on 10/8 at 99.5.     -Pain management as needed  -continue zosyn for treatment- awaiting sensitivity  -OOB and ambulate  -encourage incentive spirometer use  -Encourage breastpumping   -d/w dr Razo
Patient seen at [facility] resting comfortably offers no new complaints. Pt states RLQ pain is much improved, now very tiny pain.+ Ambulation, voiding without difficulty, + flatus; tolerating regular diet. Denies HA, CP, SOB, N/V/D,  no bm; dizziness, palpitations, worsening abdominal pain, worsening vaginal bleeding, or any other concerns.     Vital Signs Last 24 Hrs  T(C): 36.6 (09 Oct 2022 06:36), Max: 36.6 (09 Oct 2022 06:36)  T(F): 97.9 (09 Oct 2022 06:36), Max: 97.9 (09 Oct 2022 06:36)  HR: 79 (09 Oct 2022 06:36) (76 - 79)  BP: 98/61 (09 Oct 2022 06:36) (91/60 - 98/61)  BP(mean): --  RR: 16 (09 Oct 2022 06:36) (16 - 18)  SpO2: 100% (09 Oct 2022 06:36) (99% - 100%)    Parameters below as of 09 Oct 2022 06:36  Patient On (Oxygen Delivery Method): room air        Gen: A&O x 3, NAD  Chest: CTA B/L  Cardiac: S1,S2  RRR  Breast: Soft, nontender, nonengorged  Abdomen: +BS; soft; Nontender, nondistended, incision c/d/i   Gyn: no vaginal bleeding   Extremities: Nontender, no worsening edema                          11.7   6.50  )-----------( 470      ( 09 Oct 2022 07:19 )             37.7     10-    140  |  109<H>  |  7   ----------------------------<  122<H>  4.0   |  23  |  0.51    Ca    8.7      07 Oct 2022 08:43    TPro  7.1  /  Alb  2.3<L>  /  TBili  0.1<L>  /  DBili  x   /  AST  18  /  ALT  42  /  AlkPhos  91  10-07      A/P: 37 year old  PPD#26 s/p primary  with fever and tachycardia, right pelvic collection, suspected pyosalpinx/abscess; stable, POD#2 s/p IR drainage  - f/u IR drainage cultures - no growth   - ID consult appreciated, continue zosyn  - f/u am cbc/bp/esr/crp   - f/u Blood cultures, urine culture - no growth to date   - GC/Chlamydia negative    - Pain management prn  - regular diet  - encourage breast pumping   - OOB and ambulate  - d/w Dr. Rocha, house attending
Patient seen resting comfortably.   Reports breast pain, has not expressed breast milk since admission.  Denies HA, CP, SOB, dizziness, palpitations, worsening abdominal pain, worsening vaginal bleeding or any other concerns.  + Ambulation, + void without difficulty, + flatus and tolerating regular diet.     Vital Signs Last 24 Hrs  T(C): 36.7 (05 Oct 2022 14:43), Max: 38.8 (05 Oct 2022 09:20)  T(F): 98 (05 Oct 2022 14:43), Max: 101.9 (05 Oct 2022 09:20)  HR: 104 (05 Oct 2022 14:43) (104 - 132)  BP: 91/61 (05 Oct 2022 14:43) (91/61 - 109/76)  BP(mean): --  RR: 18 (05 Oct 2022 14:43) (15 - 18)  SpO2: 99% (05 Oct 2022 14:43) (95% - 99%)    Parameters below as of 05 Oct 2022 14:43  Patient On (Oxygen Delivery Method): room air        Gen: A&O x3, NAD  breast: mildly engorged, no warmth  or erythema noted  Abdomen: Soft, nontender, +BS, nondistended, incisions clean/dry/intact  Gyn: minimal lochia  Extremities: Nontender, no worsening edema, DTRS 2+,    cbc                       12.3   10.24 )-----------( 418      ( 04 Oct 2022 20:20 )             37.8       10-04    138  |  104  |  10  ----------------------------<  104<H>  3.4<L>   |  22  |  0.53    Ca    9.0      04 Oct 2022 20:20    TPro  8.0  /  Alb  2.8<L>  /  TBili  0.2  /  DBili  x   /  AST  14  /  ALT  21  /  AlkPhos  113  10-04    < from: CT Abdomen and Pelvis w/ IV Cont (10.04.22 @ 23:39) >  IMPRESSION:    No pulmonary embolism though evaluation of subsegmental branches limited   secondary respiratory motion artifact.    Marked dilatation of the right renal pelvis as well as mild dilatation of   left renal pelvis.    Postoperative changes of reported recent  with fluid collection   measuring up to 1.5 cm containing small foci of air identified within   incision site extending to endometrial canal. Wound dehiscence and/or   endometritis difficult to exclude.    Tubular shaped rim enhancing fluid collection measuring 5.1 x 2.2 cm   identified in the right lower quadrant/adnexal region with adjacent   extensive phlegmonous changes (37:19). Findings concerning for developing   abscess and/or pyelo-salpinx. Possibility of contained ruptured with   developing abscess considered. OB/GYN consultation is advised. Additional   rim-enhancing fluid collection within the endocervical canal measuring   3.6 x 0.9 cm (101:17).    Indeterminate large right adnexal complex cystic lesions measuring 8 x   5.8 cm and 4.7 x 3 cm identified. Correlation with pelvic ultrasound   and/or MRI is advised as patient remains at increased risk for right   ovarian torsion.    < end of copied text >        -
 [id number] used    Patient seen resting comfortably.  No current complaints.  Denies HA, CP, SOB, dizziness, palpitations, worsening abdominal pain, worsening vaginal bleeding or any other concerns.  + Ambulation, + void without difficulty, + flatus and tolerating regular diet.     Vital Signs Last 24 Hrs  T(C): 36.9 (08 Oct 2022 05:05), Max: 37.5 (08 Oct 2022 00:10)  T(F): 98.5 (08 Oct 2022 05:05), Max: 99.5 (08 Oct 2022 00:10)  HR: 88 (08 Oct 2022 05:05) (78 - 95)  BP: 92/56 (08 Oct 2022 05:05) (86/60 - 114/78)  BP(mean): 74 (07 Oct 2022 17:21) (74 - 78)  RR: 18 (08 Oct 2022 05:05) (15 - 21)  SpO2: 94% (08 Oct 2022 05:05) (94% - 100%)    Parameters below as of 08 Oct 2022 05:05  Patient On (Oxygen Delivery Method): room air        Gen: A&O x3, NAD  Chest: CTABL  Cardiac: S1+S2+ RRR  Abdomen: Soft, nontender, +BS, nondistended, dressing removed from drainage site, intact  Gyn: No active bleeding  Extremities: Nontender, no worsening edema, DTRS 2+, venodynes intact bilaterally                          10.8   9.90  )-----------( 282      ( 07 Oct 2022 08:43 )             34.7    10-07    140  |  109<H>  |  7   ----------------------------<  122<H>  4.0   |  23  |  0.51    Ca    8.7      07 Oct 2022 08:43    TPro  7.1  /  Alb  2.3<L>  /  TBili  0.1<L>  /  DBili  x   /  AST  18  /  ALT  42  /  AlkPhos  91  10-07      A/P:  37 year old female admitted for postop fever with abscess s/p drainage yesterday.      - Continue current care  - Follow up cultures  -Continue antibiotics and ID recommendations  - Ambulate daily      Discussed with Dr Rivas
PA NOTE:    Patient seen at bedisde resting comfortably offers no new complaints- still with RLQ pain. + Ambulation, + void without difficulty, + flatus; tolerating regular diet. both breastfpumping. Denies HA, CP, SOB, N/V/D,  no bm; dizziness, palpitations, worsening abdominal pain, worsening vaginal bleeding, or any other concerns.     Vital Signs Last 24 Hrs  T(C): 36.8 (07 Oct 2022 05:15), Max: 37.8 (06 Oct 2022 14:49)  T(F): 98.3 (07 Oct 2022 05:15), Max: 100 (06 Oct 2022 14:49)  HR: 111 (07 Oct 2022 05:15) (102 - 111)  BP: 96/62 (07 Oct 2022 05:15) (90/62 - 99/66)  BP(mean): --  RR: 18 (07 Oct 2022 05:15) (16 - 18)  SpO2: 100% (07 Oct 2022 05:15) (99% - 100%)    Parameters below as of 07 Oct 2022 05:15  Patient On (Oxygen Delivery Method): room air        Gen: A&O x 3, NAD  Chest: CTABL  Cardiac: S1+S2+ RRR  Breast: Soft, nontender, nonengorged  Abdomen: +BS; soft; Nontender, nondistended, incision healed no induration  Gyn: Minimal lochia  Extremities: Nontender, DTRS 2+, no worsening edema                          11.5   11.53 )-----------( 409      ( 06 Oct 2022 08:45 )             36.4       A/P: POD #24 s/p c/s readmitt for fevers found to have engorgment   and pelvic abscess.     - NPO for IR drainage of tuboovarian abscess today  -Iv antibiotic zosyn  -Pain management as needed  -OOB and ambulate  -Encourage breastpumping   -d/w dr Carter
PA NOTE:    Patient seen at bedisde resting comfortably offers no new complaints. Feels  much better. + Ambulation, + void without difficulty, + flatus; tolerating regular diet. both breastfeeding and bottle feeding. Denies HA, CP, SOB, N/V/D,  no bm; dizziness, palpitations, worsening abdominal pain, worsening vaginal bleeding, or any other concerns.     Vital Signs Last 24 Hrs  T(C): 36.3 (11 Oct 2022 05:20), Max: 36.7 (10 Oct 2022 13:04)  T(F): 97.3 (11 Oct 2022 05:20), Max: 98 (10 Oct 2022 13:04)  HR: 80 (11 Oct 2022 05:20) (79 - 87)  BP: 90/54 (11 Oct 2022 05:20) (90/54 - 99/65)  BP(mean): --  RR: 16 (11 Oct 2022 05:20) (16 - 16)  SpO2: 98% (11 Oct 2022 05:20) (98% - 100%)    Parameters below as of 11 Oct 2022 05:20  Patient On (Oxygen Delivery Method): room air        Gen: A&O x 3, NAD  Chest: CTABL  Cardiac: S1+S2+ RRR  Breast: Soft, nontender, nonengorged  Abdomen: +BS; soft; Nontender, nondistended, incision c/d/i  Gyn: Minimal lochia  Extremities: Nontender, DTRS 2+, no worsening edema                          11.7   6.72  )-----------( 481      ( 10 Oct 2022 06:28 )             37.5       A/P: POD #28 s/p c/s  s/p drainage of pelvic abscess and antibiotics.   Resolved breast engorgement.     -Pain management as needed  -OOB and ambulate  - PICC line placement possibly today   - Care Coordinator  -Encourage breastfeeding   -d/w dr Ramos
Patient seen at bedside resting comfortably, pt complains of RLQ pain, had fever yesterday. + Ambulation, voiding without difficulty, + flatus; tolerating regular diet. Pt is breastpumping Denies HA, CP, SOB, N/V/D,  no bm; dizziness, palpitations, worsening abdominal pain, worsening vaginal bleeding, or any other concerns.     Vital Signs Last 24 Hrs  T(C): 37.7 (06 Oct 2022 05:05), Max: 38.8 (05 Oct 2022 09:20)  T(F): 99.9 (06 Oct 2022 05:05), Max: 101.9 (05 Oct 2022 09:20)  HR: 123 (06 Oct 2022 05:05) (97 - 131)  BP: 101/61 (06 Oct 2022 05:05) (91/61 - 101/67)  BP(mean): --  RR: 16 (06 Oct 2022 05:05) (15 - 18)  SpO2: 95% (06 Oct 2022 05:05) (95% - 99%)    Parameters below as of 06 Oct 2022 05:05  Patient On (Oxygen Delivery Method): room air        Gen: AAOx3, NAD, resting comfortably   Breast: engorgement of b/l breast, no erythema or warmth   Abd: soft, tender to deep palpation of RLQ, no rebound or guarding, fundus firm, incision c/d/i, no bleeding, drainage, discharge or erythema   no CVA tenderness   pelvic: deferred    Ext: no edemaa                          10.9   11.26 )-----------( 326      ( 05 Oct 2022 22:40 )             33.8     10-05    140  |  110<H>  |  12  ----------------------------<  115<H>  3.1<L>   |  23  |  0.52    Ca    8.4      05 Oct 2022 22:40    TPro  8.0  /  Alb  2.8<L>  /  TBili  0.2  /  DBili  x   /  AST  14  /  ALT  21  /  AlkPhos  113  10-04      A/P: 37 year old  PPD#23 s/p primary  with fever and tachycardia, suspected intraabdominal abscess  - ID consult appreciated, continue zosyn/vanco  - crp, sed rate, calcitonin resulted   - f/u Blood cultures, urine culture   - GC/Chlamydia ordered   - IR consulted, recommend MRI for visualization f/u official read  - Pain management prn  - regular diet  - manual breast pump given, lactation consultant spoke with patient   - OOB and ambulate  - d/w Dr. Sung, house attending  
Patient evaluated at bedside, offers no acute complaints. Tolerating regular diet, Voiding without difficulty; +flatus, +BM  Denies fever/chills, nausea/vomiting, headache, dizziness, chest pains, shortness of breath, palpitations    Vital Signs Last 24 Hrs  T(C): 36.8 (12 Oct 2022 05:48), Max: 36.8 (11 Oct 2022 20:25)  T(F): 98.2 (12 Oct 2022 05:48), Max: 98.2 (11 Oct 2022 20:25)  HR: 82 (12 Oct 2022 05:48) (82 - 88)  BP: 99/66 (12 Oct 2022 05:48) (90/69 - 99/66)  BP(mean): 77 (12 Oct 2022 05:48) (77 - 77)  RR: 18 (12 Oct 2022 05:48) (16 - 18)  SpO2: 98% (12 Oct 2022 05:48) (98% - 100%)    Parameters below as of 12 Oct 2022 05:48  Patient On (Oxygen Delivery Method): room air    PE: Pt appears comfortable, NAD, AAOx3  Chest: CTA bilaterally, no W/R/R  Cardiac: RRR  Breasts: soft, NT/nonengorged bilaterally; no masses, no erythema  Abd: soft; NT; no guarding or rebound; +BS x4 quad; incision C/D/I, no erythema, no wound drainage or bleeding, no swelling  Gyn: no bleeding  Ext: soft, NT; DTRs 2+ bilaterally; no worsening edema      d/w Dr. Savage

## 2022-10-12 NOTE — PROGRESS NOTE ADULT - REASON FOR ADMISSION
post op fever
TOA
post op fever
post op fever
TOA
post op fever

## 2022-10-12 NOTE — DISCHARGE NOTE NURSING/CASE MANAGEMENT/SOCIAL WORK - NSDCFUADDAPPT_GEN_ALL_CORE_FT
Pt requires a [facility]  [facility]  [address] [place] [address]  Phone: [phone]  Fax: [phone]  Established Patient  Follow Up Time: 2 weeks  Pt requires a repeat CT scan in 2 weeks to access the abscess  at CT scan in 2 weeks to access the abscess

## 2022-10-12 NOTE — DISCHARGE NOTE NURSING/CASE MANAGEMENT/SOCIAL WORK - PATIENT PORTAL LINK FT
You can access the FollowMyHealth Patient Portal offered by [facility] by registering at the following website: [email or web address]. By joining [employer]’s FollowMyHealth portal, you will also be able to view your health information using other applications (apps) compatible with our system.

## 2022-10-12 NOTE — DISCHARGE NOTE NURSING/CASE MANAGEMENT/SOCIAL WORK - NSDCPEFALRISK_GEN_ALL_CORE
For information on Fall & Injury Prevention, visit: [email or web address] OR  [email or web address] OR  https://www.cdc.gov/steadi/patient.html

## 2023-08-16 NOTE — DISCHARGE NOTE OB - CHECK WITH YOUR HEALTHCARE PROVIDER FOR ADDITIONAL LIMITATIONS ON ACTIVITIES IF YOU HAVE HAD A C-SECTION
Zelaya Sexual/Various/Other Zelaya Sexual/Various/Other Zelaya Sexual/Various/Other Zelaya Sexual/Various/other Zelaya Sexual/Various/Other Zelaya Sexual/Various/Other Zelaya Sexual/Various/Other Zelaya Sexual/Various/Other Zelaya Sexual/Various/Other Zelaya Sexual/Various/Other Zelaya Sexual/Various/other Zelaya Sexual/Various/Other Zelaya Sexual/Various/Other Zelaya Sexual/Various/Other Zelaya Sexual/Various/Other Zelaya Sexual/Various/Other Zelaya Sexual/Various/Other Zelaya Sexual/Various/Other Zelaya Sexual/Various/Other Zelaya Sexual/Various/Other Zelaya Sexual/Various/Other Statement Selected

## 2024-03-18 NOTE — PATIENT PROFILE ADULT - HAVE YOU HAD A FIRST COVID-19 BOOSTER?
No You can access the FollowMyHealth Patient Portal offered by [facility] by registering at the following website: [email or web address]. By joining [email or web address]’s FollowMyHealth portal, you will also be able to view your health information using other applications (apps) compatible with our system.

## 2024-06-13 ENCOUNTER — OUTPATIENT (OUTPATIENT)
Dept: OUTPATIENT SERVICES | Facility: HOSPITAL | Age: 39
LOS: 1 days | End: 2024-06-13
Payer: MEDICAID

## 2024-06-13 VITALS
OXYGEN SATURATION: 98 % | HEART RATE: 98 BPM | TEMPERATURE: 98 F | RESPIRATION RATE: 18 BRPM | DIASTOLIC BLOOD PRESSURE: 66 MMHG | SYSTOLIC BLOOD PRESSURE: 103 MMHG

## 2024-06-13 DIAGNOSIS — O26.899 OTHER SPECIFIED PREGNANCY RELATED CONDITIONS, UNSPECIFIED TRIMESTER: ICD-10-CM

## 2024-06-13 LAB — GLUCOSE BLDC GLUCOMTR-MCNC: 136 MG/DL — HIGH (ref 70–99)

## 2024-06-13 PROCEDURE — 76818 FETAL BIOPHYS PROFILE W/NST: CPT | Mod: 26

## 2024-06-13 PROCEDURE — 99213 OFFICE O/P EST LOW 20 MIN: CPT

## 2024-06-13 NOTE — OB PROVIDER TRIAGE NOTE - NSOBPROVIDERNOTE_OBGYN_ALL_OB_FT
39y.o f siup at 37.6wks for nst   nst/bpp  d/w  39y.o f siup at 37.6wks for nst   nst/bpp  d/w       nst cat I reviewed with dr.Rimarachin foley 8/8 deborah 11

## 2024-06-13 NOTE — OB PROVIDER TRIAGE NOTE - HISTORY OF PRESENT ILLNESS
39y.o f  ega 37.6wks came from office for nst.  pt reports +fm, no vb, no lof,   wants tolac  ob pnc sajjad  allergy nka  medhx denies  obhx 2022 gdma1 c/s   social no s/a/d  gynhx no stds

## 2024-06-14 DIAGNOSIS — Z03.79 ENCOUNTER FOR OTHER SUSPECTED MATERNAL AND FETAL CONDITIONS RULED OUT: ICD-10-CM

## 2024-06-14 DIAGNOSIS — Z3A.37 37 WEEKS GESTATION OF PREGNANCY: ICD-10-CM

## 2024-06-14 DIAGNOSIS — E03.9 HYPOTHYROIDISM, UNSPECIFIED: ICD-10-CM

## 2024-06-14 DIAGNOSIS — O34.219 MATERNAL CARE FOR UNSPECIFIED TYPE SCAR FROM PREVIOUS CESAREAN DELIVERY: ICD-10-CM

## 2024-06-14 DIAGNOSIS — O09.523 SUPERVISION OF ELDERLY MULTIGRAVIDA, THIRD TRIMESTER: ICD-10-CM

## 2024-06-14 DIAGNOSIS — O24.419 GESTATIONAL DIABETES MELLITUS IN PREGNANCY, UNSPECIFIED CONTROL: ICD-10-CM

## 2024-06-14 DIAGNOSIS — O99.283 ENDOCRINE, NUTRITIONAL AND METABOLIC DISEASES COMPLICATING PREGNANCY, THIRD TRIMESTER: ICD-10-CM

## 2024-06-15 PROBLEM — E03.9 HYPOTHYROIDISM, UNSPECIFIED: Chronic | Status: ACTIVE | Noted: 2022-10-05

## 2024-06-20 ENCOUNTER — INPATIENT (INPATIENT)
Facility: HOSPITAL | Age: 39
LOS: 1 days | Discharge: HOME | DRG: 951 | End: 2024-06-22
Attending: OBSTETRICS & GYNECOLOGY | Admitting: OBSTETRICS & GYNECOLOGY
Payer: MEDICAID

## 2024-06-20 VITALS
SYSTOLIC BLOOD PRESSURE: 109 MMHG | TEMPERATURE: 98 F | HEART RATE: 85 BPM | DIASTOLIC BLOOD PRESSURE: 69 MMHG | OXYGEN SATURATION: 99 % | RESPIRATION RATE: 16 BRPM

## 2024-06-20 DIAGNOSIS — O26.899 OTHER SPECIFIED PREGNANCY RELATED CONDITIONS, UNSPECIFIED TRIMESTER: ICD-10-CM

## 2024-06-20 DIAGNOSIS — Z34.80 ENCOUNTER FOR SUPERVISION OF OTHER NORMAL PREGNANCY, UNSPECIFIED TRIMESTER: ICD-10-CM

## 2024-06-20 LAB
APTT BLD: 29.2 SEC — SIGNIFICANT CHANGE UP (ref 24.5–35.6)
BASOPHILS # BLD AUTO: 0.04 K/UL — SIGNIFICANT CHANGE UP (ref 0–0.2)
BASOPHILS NFR BLD AUTO: 0.5 % — SIGNIFICANT CHANGE UP (ref 0–2)
BLD GP AB SCN SERPL QL: SIGNIFICANT CHANGE UP
EOSINOPHIL # BLD AUTO: 0.14 K/UL — SIGNIFICANT CHANGE UP (ref 0–0.5)
EOSINOPHIL NFR BLD AUTO: 1.6 % — SIGNIFICANT CHANGE UP (ref 0–6)
FIBRINOGEN PPP-MCNC: 558 MG/DL — HIGH (ref 200–475)
GLUCOSE BLDC GLUCOMTR-MCNC: 112 MG/DL — HIGH (ref 70–99)
GLUCOSE BLDC GLUCOMTR-MCNC: 90 MG/DL — SIGNIFICANT CHANGE UP (ref 70–99)
HBV SURFACE AG SERPL QL IA: SIGNIFICANT CHANGE UP
HCT VFR BLD CALC: 35.8 % — SIGNIFICANT CHANGE UP (ref 34.5–45)
HCV AB S/CO SERPL IA: 0.09 S/CO — SIGNIFICANT CHANGE UP (ref 0–0.99)
HCV AB SERPL-IMP: SIGNIFICANT CHANGE UP
HGB BLD-MCNC: 11.2 G/DL — LOW (ref 11.5–15.5)
HIV 1 & 2 AB SERPL IA.RAPID: SIGNIFICANT CHANGE UP
IMM GRANULOCYTES NFR BLD AUTO: 0.8 % — SIGNIFICANT CHANGE UP (ref 0–0.9)
INR BLD: 0.86 RATIO — SIGNIFICANT CHANGE UP (ref 0.85–1.18)
LYMPHOCYTES # BLD AUTO: 1.92 K/UL — SIGNIFICANT CHANGE UP (ref 1–3.3)
LYMPHOCYTES # BLD AUTO: 21.8 % — SIGNIFICANT CHANGE UP (ref 13–44)
MCHC RBC-ENTMCNC: 23.9 PG — LOW (ref 27–34)
MCHC RBC-ENTMCNC: 31.3 GM/DL — LOW (ref 32–36)
MCV RBC AUTO: 76.3 FL — LOW (ref 80–100)
MONOCYTES # BLD AUTO: 0.57 K/UL — SIGNIFICANT CHANGE UP (ref 0–0.9)
MONOCYTES NFR BLD AUTO: 6.5 % — SIGNIFICANT CHANGE UP (ref 2–14)
NEUTROPHILS # BLD AUTO: 6.05 K/UL — SIGNIFICANT CHANGE UP (ref 1.8–7.4)
NEUTROPHILS NFR BLD AUTO: 68.8 % — SIGNIFICANT CHANGE UP (ref 43–77)
NRBC # BLD: 0 /100 WBCS — SIGNIFICANT CHANGE UP (ref 0–0)
PLATELET # BLD AUTO: 279 K/UL — SIGNIFICANT CHANGE UP (ref 150–400)
PROTHROM AB SERPL-ACNC: 9.9 SEC — SIGNIFICANT CHANGE UP (ref 9.5–13)
RBC # BLD: 4.69 M/UL — SIGNIFICANT CHANGE UP (ref 3.8–5.2)
RBC # FLD: 17.6 % — HIGH (ref 10.3–14.5)
RUBV IGG SER-ACNC: 3.46 INDEX — SIGNIFICANT CHANGE UP
RUBV IGG SER-IMP: POSITIVE — SIGNIFICANT CHANGE UP
T PALLIDUM AB TITR SER: NEGATIVE — SIGNIFICANT CHANGE UP
WBC # BLD: 8.79 K/UL — SIGNIFICANT CHANGE UP (ref 3.8–10.5)
WBC # FLD AUTO: 8.79 K/UL — SIGNIFICANT CHANGE UP (ref 3.8–10.5)

## 2024-06-20 PROCEDURE — 85730 THROMBOPLASTIN TIME PARTIAL: CPT

## 2024-06-20 PROCEDURE — 76818 FETAL BIOPHYS PROFILE W/NST: CPT

## 2024-06-20 PROCEDURE — 86901 BLOOD TYPING SEROLOGIC RH(D): CPT

## 2024-06-20 PROCEDURE — 85025 COMPLETE CBC W/AUTO DIFF WBC: CPT

## 2024-06-20 PROCEDURE — 59025 FETAL NON-STRESS TEST: CPT

## 2024-06-20 PROCEDURE — G0463: CPT

## 2024-06-20 PROCEDURE — 85610 PROTHROMBIN TIME: CPT

## 2024-06-20 PROCEDURE — 36415 COLL VENOUS BLD VENIPUNCTURE: CPT

## 2024-06-20 PROCEDURE — 86780 TREPONEMA PALLIDUM: CPT

## 2024-06-20 PROCEDURE — 82962 GLUCOSE BLOOD TEST: CPT

## 2024-06-20 PROCEDURE — 86923 COMPATIBILITY TEST ELECTRIC: CPT

## 2024-06-20 PROCEDURE — 85384 FIBRINOGEN ACTIVITY: CPT

## 2024-06-20 PROCEDURE — 86900 BLOOD TYPING SEROLOGIC ABO: CPT

## 2024-06-20 PROCEDURE — 86850 RBC ANTIBODY SCREEN: CPT

## 2024-06-20 RX ORDER — SODIUM CHLORIDE 9 MG/ML
1000 INJECTION, SOLUTION INTRAVENOUS
Refills: 0 | Status: ACTIVE | OUTPATIENT
Start: 2024-06-20 | End: 2025-05-19

## 2024-06-20 RX ORDER — MODIFIED LANOLIN 100 %
1 CREAM (GRAM) TOPICAL EVERY 6 HOURS
Refills: 0 | Status: DISCONTINUED | OUTPATIENT
Start: 2024-06-20 | End: 2024-06-22

## 2024-06-20 RX ORDER — ACETAMINOPHEN 500 MG/5ML
975 LIQUID (ML) ORAL
Refills: 0 | Status: DISCONTINUED | OUTPATIENT
Start: 2024-06-20 | End: 2024-06-22

## 2024-06-20 RX ORDER — AZITHROMYCIN 250 MG
500 CAPSULE ORAL ONCE
Refills: 0 | Status: COMPLETED | OUTPATIENT
Start: 2024-06-20 | End: 2024-06-20

## 2024-06-20 RX ORDER — SODIUM CHLORIDE 9 G/1000ML
1000 INJECTION, SOLUTION INTRAVENOUS
Refills: 0 | Status: DISCONTINUED | OUTPATIENT
Start: 2024-06-20 | End: 2024-06-20

## 2024-06-20 RX ORDER — DIPHENHYDRAMINE HCL 12.5MG/5ML
25 ELIXIR ORAL EVERY 6 HOURS
Refills: 0 | Status: DISCONTINUED | OUTPATIENT
Start: 2024-06-20 | End: 2024-06-22

## 2024-06-20 RX ORDER — IBUPROFEN 200 MG
600 TABLET ORAL EVERY 6 HOURS
Refills: 0 | Status: DISCONTINUED | OUTPATIENT
Start: 2024-06-20 | End: 2024-06-22

## 2024-06-20 RX ORDER — HEPARIN SODIUM 1000 [USP'U]/ML
5000 INJECTION INTRAVENOUS; SUBCUTANEOUS EVERY 12 HOURS
Refills: 0 | Status: DISCONTINUED | OUTPATIENT
Start: 2024-06-21 | End: 2024-06-22

## 2024-06-20 RX ORDER — OXYTOCIN 10 UNIT/ML
333.33 VIAL (ML) INJECTION
Qty: 20 | Refills: 0 | Status: ACTIVE | OUTPATIENT
Start: 2024-06-20

## 2024-06-20 RX ORDER — OXYTOCIN-SODIUM CHLORIDE 0.9% IV SOLN 30 UNIT/500ML 30-0.9/5 UT/ML-%
333.33 SOLUTION INTRAVENOUS
Qty: 20 | Refills: 0 | Status: COMPLETED | OUTPATIENT
Start: 2024-06-20 | End: 2024-06-20

## 2024-06-20 RX ORDER — OXYTOCIN-SODIUM CHLORIDE 0.9% IV SOLN 30 UNIT/500ML 30-0.9/5 UT/ML-%
333.33 SOLUTION INTRAVENOUS
Qty: 20 | Refills: 0 | Status: COMPLETED | OUTPATIENT
Start: 2024-06-20

## 2024-06-20 RX ORDER — CITRIC ACID/SODIUM CITRATE 300-500 MG
30 SOLUTION, ORAL ORAL ONCE
Refills: 0 | Status: COMPLETED | OUTPATIENT
Start: 2024-06-20 | End: 2024-06-20

## 2024-06-20 RX ORDER — SODIUM CHLORIDE 9 G/1000ML
1000 INJECTION, SOLUTION INTRAVENOUS
Refills: 0 | Status: DISCONTINUED | OUTPATIENT
Start: 2024-06-20 | End: 2024-06-22

## 2024-06-20 RX ORDER — SIMETHICONE 80 MG
80 TABLET,CHEWABLE ORAL EVERY 4 HOURS
Refills: 0 | Status: DISCONTINUED | OUTPATIENT
Start: 2024-06-20 | End: 2024-06-22

## 2024-06-20 RX ORDER — DIPHENHYDRAMINE HCL 12.5MG/5ML
25 ELIXIR ORAL ONCE
Refills: 0 | Status: COMPLETED | OUTPATIENT
Start: 2024-06-20 | End: 2024-06-20

## 2024-06-20 RX ORDER — FOLIC ACID 1 MG/1
1 TABLET ORAL DAILY
Refills: 0 | Status: DISCONTINUED | OUTPATIENT
Start: 2024-06-20 | End: 2024-06-22

## 2024-06-20 RX ORDER — CHLORHEXIDINE GLUCONATE 213 G/1000ML
1 SOLUTION TOPICAL DAILY
Refills: 0 | Status: ACTIVE | OUTPATIENT
Start: 2024-06-20 | End: 2025-05-19

## 2024-06-20 RX ORDER — DEXAMETHASONE 0.5 MG/1
4 TABLET ORAL EVERY 6 HOURS
Refills: 0 | Status: DISCONTINUED | OUTPATIENT
Start: 2024-06-20 | End: 2024-06-22

## 2024-06-20 RX ORDER — NALOXONE HYDROCHLORIDE 0.4 MG/ML
0.1 INJECTION, SOLUTION INTRAMUSCULAR; INTRAVENOUS; SUBCUTANEOUS
Refills: 0 | Status: DISCONTINUED | OUTPATIENT
Start: 2024-06-20 | End: 2024-06-22

## 2024-06-20 RX ORDER — FERROUS SULFATE 137(45) MG
325 TABLET, EXTENDED RELEASE ORAL DAILY
Refills: 0 | Status: DISCONTINUED | OUTPATIENT
Start: 2024-06-20 | End: 2024-06-22

## 2024-06-20 RX ORDER — OXYCODONE HYDROCHLORIDE 30 MG/1
5 TABLET ORAL EVERY 6 HOURS
Refills: 0 | Status: DISCONTINUED | OUTPATIENT
Start: 2024-06-20 | End: 2024-06-22

## 2024-06-20 RX ORDER — CEFAZOLIN SODIUM IN 0.9 % NACL 3 G/100 ML
2000 INTRAVENOUS SOLUTION, PIGGYBACK (ML) INTRAVENOUS ONCE
Refills: 0 | Status: COMPLETED | OUTPATIENT
Start: 2024-06-20 | End: 2024-06-20

## 2024-06-20 RX ORDER — ONDANSETRON HCL/PF 4 MG/2 ML
4 VIAL (ML) INJECTION EVERY 6 HOURS
Refills: 0 | Status: DISCONTINUED | OUTPATIENT
Start: 2024-06-20 | End: 2024-06-22

## 2024-06-20 RX ORDER — CITRIC ACID/SODIUM CITRATE 300-500 MG
15 SOLUTION, ORAL ORAL EVERY 6 HOURS
Refills: 0 | Status: ACTIVE | OUTPATIENT
Start: 2024-06-20 | End: 2025-05-19

## 2024-06-20 RX ORDER — KETOROLAC TROMETHAMINE 30 MG/ML
30 INJECTION, SOLUTION INTRAMUSCULAR; INTRAVENOUS EVERY 6 HOURS
Refills: 0 | Status: DISCONTINUED | OUTPATIENT
Start: 2024-06-20 | End: 2024-06-22

## 2024-06-20 RX ORDER — CITRIC ACID/SODIUM CITRATE 300-500 MG
15 SOLUTION, ORAL ORAL EVERY 6 HOURS
Refills: 0 | Status: DISCONTINUED | OUTPATIENT
Start: 2024-06-20 | End: 2024-06-20

## 2024-06-20 RX ORDER — PRENATAL 136/IRON/FOLIC ACID 27 MG-1 MG
1 TABLET ORAL DAILY
Refills: 0 | Status: DISCONTINUED | OUTPATIENT
Start: 2024-06-20 | End: 2024-06-22

## 2024-06-20 RX ORDER — MAGNESIUM HYDROXIDE 400 MG/5ML
30 SUSPENSION ORAL
Refills: 0 | Status: DISCONTINUED | OUTPATIENT
Start: 2024-06-20 | End: 2024-06-22

## 2024-06-20 RX ADMIN — OXYTOCIN-SODIUM CHLORIDE 0.9% IV SOLN 30 UNIT/500ML 1000 MILLIUNIT(S)/MIN: 30-0.9/5 SOLUTION at 12:56

## 2024-06-20 RX ADMIN — Medication 80 MILLIGRAM(S): at 21:58

## 2024-06-20 RX ADMIN — Medication 0.2 MILLIGRAM(S): at 18:11

## 2024-06-20 RX ADMIN — Medication 975 MILLIGRAM(S): at 21:58

## 2024-06-20 RX ADMIN — Medication 100 MILLIGRAM(S): at 12:29

## 2024-06-20 RX ADMIN — Medication 80 MILLIGRAM(S): at 18:11

## 2024-06-20 RX ADMIN — Medication 975 MILLIGRAM(S): at 22:50

## 2024-06-20 RX ADMIN — Medication 0.2 MILLIGRAM(S): at 13:03

## 2024-06-20 RX ADMIN — Medication 30 MILLILITER(S): at 00:30

## 2024-06-20 RX ADMIN — Medication 1 APPLICATION(S): at 11:50

## 2024-06-20 RX ADMIN — SODIUM CHLORIDE 125 MILLILITER(S): 9 INJECTION, SOLUTION INTRAVENOUS at 07:08

## 2024-06-20 RX ADMIN — Medication 20 MILLIGRAM(S): at 12:30

## 2024-06-20 RX ADMIN — Medication 255 MILLIGRAM(S): at 10:10

## 2024-06-20 RX ADMIN — Medication 25 MILLIGRAM(S): at 14:03

## 2024-06-20 RX ADMIN — KETOROLAC TROMETHAMINE 30 MILLIGRAM(S): 30 INJECTION, SOLUTION INTRAMUSCULAR; INTRAVENOUS at 19:11

## 2024-06-20 RX ADMIN — KETOROLAC TROMETHAMINE 30 MILLIGRAM(S): 30 INJECTION, SOLUTION INTRAMUSCULAR; INTRAVENOUS at 18:11

## 2024-06-20 RX ADMIN — Medication 20 MILLIGRAM(S): at 18:12

## 2024-06-21 LAB
BASOPHILS # BLD AUTO: 0.05 K/UL — SIGNIFICANT CHANGE UP (ref 0–0.2)
BASOPHILS NFR BLD AUTO: 0.4 % — SIGNIFICANT CHANGE UP (ref 0–2)
EOSINOPHIL # BLD AUTO: 0.07 K/UL — SIGNIFICANT CHANGE UP (ref 0–0.5)
EOSINOPHIL NFR BLD AUTO: 0.5 % — SIGNIFICANT CHANGE UP (ref 0–6)
HCT VFR BLD CALC: 32.1 % — LOW (ref 34.5–45)
HGB BLD-MCNC: 10.3 G/DL — LOW (ref 11.5–15.5)
IMM GRANULOCYTES NFR BLD AUTO: 0.9 % — SIGNIFICANT CHANGE UP (ref 0–0.9)
LYMPHOCYTES # BLD AUTO: 1.75 K/UL — SIGNIFICANT CHANGE UP (ref 1–3.3)
LYMPHOCYTES # BLD AUTO: 12.5 % — LOW (ref 13–44)
MCHC RBC-ENTMCNC: 24.9 PG — LOW (ref 27–34)
MCHC RBC-ENTMCNC: 32.1 GM/DL — SIGNIFICANT CHANGE UP (ref 32–36)
MCV RBC AUTO: 77.7 FL — LOW (ref 80–100)
MONOCYTES # BLD AUTO: 0.8 K/UL — SIGNIFICANT CHANGE UP (ref 0–0.9)
MONOCYTES NFR BLD AUTO: 5.7 % — SIGNIFICANT CHANGE UP (ref 2–14)
NEUTROPHILS # BLD AUTO: 11.2 K/UL — HIGH (ref 1.8–7.4)
NEUTROPHILS NFR BLD AUTO: 80 % — HIGH (ref 43–77)
NRBC # BLD: 0 /100 WBCS — SIGNIFICANT CHANGE UP (ref 0–0)
NRBC BLD-RTO: 0 /100 WBCS — SIGNIFICANT CHANGE UP (ref 0–0)
PLATELET # BLD AUTO: 262 K/UL — SIGNIFICANT CHANGE UP (ref 150–400)
RBC # BLD: 4.13 M/UL — SIGNIFICANT CHANGE UP (ref 3.8–5.2)
RBC # FLD: 17.5 % — HIGH (ref 10.3–14.5)
WBC # BLD: 13.99 K/UL — HIGH (ref 3.8–10.5)
WBC # FLD AUTO: 13.99 K/UL — HIGH (ref 3.8–10.5)

## 2024-06-21 RX ORDER — PRENATAL 136/IRON/FOLIC ACID 27 MG-1 MG
1 TABLET ORAL
Qty: 30 | Refills: 3
Start: 2024-06-21 | End: 2024-10-18

## 2024-06-21 RX ORDER — SIMETHICONE 80 MG
1 TABLET,CHEWABLE ORAL
Qty: 30 | Refills: 0
Start: 2024-06-21 | End: 2024-06-25

## 2024-06-21 RX ORDER — IBUPROFEN 200 MG
1 TABLET ORAL
Qty: 20 | Refills: 0
Start: 2024-06-21 | End: 2024-06-25

## 2024-06-21 RX ORDER — SENNOSIDES, DOCUSATE SODIUM 8.6; 5 MG/1; MG/1
1 TABLET ORAL
Qty: 60 | Refills: 0
Start: 2024-06-21 | End: 2024-07-20

## 2024-06-21 RX ORDER — SENNA 187 MG
2 TABLET ORAL
Qty: 20 | Refills: 0
Start: 2024-06-21 | End: 2024-06-30

## 2024-06-21 RX ORDER — ACETAMINOPHEN 500 MG/5ML
2 LIQUID (ML) ORAL
Qty: 40 | Refills: 1
Start: 2024-06-21 | End: 2024-06-30

## 2024-06-21 RX ADMIN — HEPARIN SODIUM 5000 UNIT(S): 1000 INJECTION INTRAVENOUS; SUBCUTANEOUS at 02:27

## 2024-06-21 RX ADMIN — Medication 80 MILLIGRAM(S): at 02:27

## 2024-06-21 RX ADMIN — Medication 975 MILLIGRAM(S): at 00:43

## 2024-06-21 RX ADMIN — KETOROLAC TROMETHAMINE 30 MILLIGRAM(S): 30 INJECTION, SOLUTION INTRAMUSCULAR; INTRAVENOUS at 01:00

## 2024-06-21 RX ADMIN — FOLIC ACID 1 MILLIGRAM(S): 1 TABLET ORAL at 11:52

## 2024-06-21 RX ADMIN — Medication 975 MILLIGRAM(S): at 21:33

## 2024-06-21 RX ADMIN — Medication 975 MILLIGRAM(S): at 14:48

## 2024-06-21 RX ADMIN — KETOROLAC TROMETHAMINE 30 MILLIGRAM(S): 30 INJECTION, SOLUTION INTRAMUSCULAR; INTRAVENOUS at 11:52

## 2024-06-21 RX ADMIN — Medication 0.2 MILLIGRAM(S): at 00:13

## 2024-06-21 RX ADMIN — Medication 20 MILLIGRAM(S): at 05:40

## 2024-06-21 RX ADMIN — KETOROLAC TROMETHAMINE 30 MILLIGRAM(S): 30 INJECTION, SOLUTION INTRAMUSCULAR; INTRAVENOUS at 06:35

## 2024-06-21 RX ADMIN — Medication 80 MILLIGRAM(S): at 05:40

## 2024-06-21 RX ADMIN — Medication 975 MILLIGRAM(S): at 20:33

## 2024-06-21 RX ADMIN — Medication 975 MILLIGRAM(S): at 10:06

## 2024-06-21 RX ADMIN — Medication 0.2 MILLIGRAM(S): at 12:34

## 2024-06-21 RX ADMIN — Medication 975 MILLIGRAM(S): at 09:06

## 2024-06-21 RX ADMIN — KETOROLAC TROMETHAMINE 30 MILLIGRAM(S): 30 INJECTION, SOLUTION INTRAMUSCULAR; INTRAVENOUS at 12:13

## 2024-06-21 RX ADMIN — Medication 1 TABLET(S): at 11:52

## 2024-06-21 RX ADMIN — Medication 0.2 MILLIGRAM(S): at 05:49

## 2024-06-21 RX ADMIN — KETOROLAC TROMETHAMINE 30 MILLIGRAM(S): 30 INJECTION, SOLUTION INTRAMUSCULAR; INTRAVENOUS at 00:13

## 2024-06-21 RX ADMIN — Medication 80 MILLIGRAM(S): at 09:06

## 2024-06-21 RX ADMIN — Medication 20 MILLIGRAM(S): at 17:19

## 2024-06-21 RX ADMIN — Medication 975 MILLIGRAM(S): at 15:48

## 2024-06-21 RX ADMIN — Medication 975 MILLIGRAM(S): at 02:28

## 2024-06-21 RX ADMIN — Medication 80 MILLIGRAM(S): at 13:15

## 2024-06-21 RX ADMIN — MAGNESIUM HYDROXIDE 30 MILLILITER(S): 400 SUSPENSION ORAL at 20:34

## 2024-06-21 RX ADMIN — Medication 80 MILLIGRAM(S): at 17:19

## 2024-06-21 RX ADMIN — KETOROLAC TROMETHAMINE 30 MILLIGRAM(S): 30 INJECTION, SOLUTION INTRAMUSCULAR; INTRAVENOUS at 05:40

## 2024-06-21 RX ADMIN — KETOROLAC TROMETHAMINE 30 MILLIGRAM(S): 30 INJECTION, SOLUTION INTRAMUSCULAR; INTRAVENOUS at 17:19

## 2024-06-21 RX ADMIN — KETOROLAC TROMETHAMINE 30 MILLIGRAM(S): 30 INJECTION, SOLUTION INTRAMUSCULAR; INTRAVENOUS at 18:19

## 2024-06-21 RX ADMIN — Medication 325 MILLIGRAM(S): at 11:54

## 2024-06-21 RX ADMIN — HEPARIN SODIUM 5000 UNIT(S): 1000 INJECTION INTRAVENOUS; SUBCUTANEOUS at 13:15

## 2024-06-22 VITALS
SYSTOLIC BLOOD PRESSURE: 105 MMHG | RESPIRATION RATE: 16 BRPM | HEART RATE: 79 BPM | OXYGEN SATURATION: 95 % | TEMPERATURE: 98 F | DIASTOLIC BLOOD PRESSURE: 70 MMHG

## 2024-06-22 LAB
BASOPHILS # BLD AUTO: 0.05 K/UL — SIGNIFICANT CHANGE UP (ref 0–0.2)
BASOPHILS NFR BLD AUTO: 0.4 % — SIGNIFICANT CHANGE UP (ref 0–2)
EOSINOPHIL # BLD AUTO: 0.2 K/UL — SIGNIFICANT CHANGE UP (ref 0–0.5)
EOSINOPHIL NFR BLD AUTO: 1.4 % — SIGNIFICANT CHANGE UP (ref 0–6)
HCT VFR BLD CALC: 28.4 % — LOW (ref 34.5–45)
HGB BLD-MCNC: 8.9 G/DL — LOW (ref 11.5–15.5)
IMM GRANULOCYTES NFR BLD AUTO: 0.6 % — SIGNIFICANT CHANGE UP (ref 0–0.9)
LYMPHOCYTES # BLD AUTO: 1.61 K/UL — SIGNIFICANT CHANGE UP (ref 1–3.3)
LYMPHOCYTES # BLD AUTO: 11.4 % — LOW (ref 13–44)
MCHC RBC-ENTMCNC: 24.7 PG — LOW (ref 27–34)
MCHC RBC-ENTMCNC: 31.3 GM/DL — LOW (ref 32–36)
MCV RBC AUTO: 78.9 FL — LOW (ref 80–100)
MONOCYTES # BLD AUTO: 0.62 K/UL — SIGNIFICANT CHANGE UP (ref 0–0.9)
MONOCYTES NFR BLD AUTO: 4.4 % — SIGNIFICANT CHANGE UP (ref 2–14)
NEUTROPHILS # BLD AUTO: 11.53 K/UL — HIGH (ref 1.8–7.4)
NEUTROPHILS NFR BLD AUTO: 81.8 % — HIGH (ref 43–77)
NRBC # BLD: 0 /100 WBCS — SIGNIFICANT CHANGE UP (ref 0–0)
NRBC BLD-RTO: 0 /100 WBCS — SIGNIFICANT CHANGE UP (ref 0–0)
PLATELET # BLD AUTO: 276 K/UL — SIGNIFICANT CHANGE UP (ref 150–400)
RBC # BLD: 3.6 M/UL — LOW (ref 3.8–5.2)
RBC # FLD: 17.7 % — HIGH (ref 10.3–14.5)
WBC # BLD: 14.09 K/UL — HIGH (ref 3.8–10.5)
WBC # FLD AUTO: 14.09 K/UL — HIGH (ref 3.8–10.5)

## 2024-06-22 PROCEDURE — 86703 HIV-1/HIV-2 1 RESULT ANTBDY: CPT

## 2024-06-22 PROCEDURE — 59025 FETAL NON-STRESS TEST: CPT

## 2024-06-22 PROCEDURE — 86803 HEPATITIS C AB TEST: CPT

## 2024-06-22 PROCEDURE — 36415 COLL VENOUS BLD VENIPUNCTURE: CPT

## 2024-06-22 PROCEDURE — 87340 HEPATITIS B SURFACE AG IA: CPT

## 2024-06-22 PROCEDURE — 82962 GLUCOSE BLOOD TEST: CPT

## 2024-06-22 PROCEDURE — 86762 RUBELLA ANTIBODY: CPT

## 2024-06-22 PROCEDURE — 85025 COMPLETE CBC W/AUTO DIFF WBC: CPT

## 2024-06-22 PROCEDURE — 59050 FETAL MONITOR W/REPORT: CPT

## 2024-06-22 RX ORDER — IBUPROFEN 200 MG
1 TABLET ORAL
Qty: 30 | Refills: 0
Start: 2024-06-22

## 2024-06-22 RX ORDER — ACETAMINOPHEN 500 MG/5ML
2 LIQUID (ML) ORAL
Qty: 30 | Refills: 0
Start: 2024-06-22 | End: 2024-06-26

## 2024-06-22 RX ORDER — SIMETHICONE 80 MG
1 TABLET,CHEWABLE ORAL
Qty: 30 | Refills: 0
Start: 2024-06-22 | End: 2024-06-26

## 2024-06-22 RX ORDER — SENNOSIDES, DOCUSATE SODIUM 8.6; 5 MG/1; MG/1
1 TABLET ORAL
Qty: 30 | Refills: 0
Start: 2024-06-22 | End: 2024-07-21

## 2024-06-22 RX ADMIN — HEPARIN SODIUM 5000 UNIT(S): 1000 INJECTION INTRAVENOUS; SUBCUTANEOUS at 02:03

## 2024-06-22 RX ADMIN — Medication 975 MILLIGRAM(S): at 09:47

## 2024-06-22 RX ADMIN — KETOROLAC TROMETHAMINE 30 MILLIGRAM(S): 30 INJECTION, SOLUTION INTRAMUSCULAR; INTRAVENOUS at 06:41

## 2024-06-22 RX ADMIN — KETOROLAC TROMETHAMINE 30 MILLIGRAM(S): 30 INJECTION, SOLUTION INTRAMUSCULAR; INTRAVENOUS at 01:26

## 2024-06-22 RX ADMIN — Medication 80 MILLIGRAM(S): at 12:25

## 2024-06-22 RX ADMIN — FOLIC ACID 1 MILLIGRAM(S): 1 TABLET ORAL at 12:25

## 2024-06-22 RX ADMIN — Medication 80 MILLIGRAM(S): at 02:03

## 2024-06-22 RX ADMIN — Medication 20 MILLIGRAM(S): at 06:41

## 2024-06-22 RX ADMIN — Medication 975 MILLIGRAM(S): at 10:47

## 2024-06-22 RX ADMIN — Medication 80 MILLIGRAM(S): at 06:41

## 2024-06-22 RX ADMIN — Medication 325 MILLIGRAM(S): at 12:25

## 2024-06-22 RX ADMIN — Medication 1 TABLET(S): at 12:25

## 2024-06-22 RX ADMIN — KETOROLAC TROMETHAMINE 30 MILLIGRAM(S): 30 INJECTION, SOLUTION INTRAMUSCULAR; INTRAVENOUS at 07:41

## 2024-06-22 RX ADMIN — KETOROLAC TROMETHAMINE 30 MILLIGRAM(S): 30 INJECTION, SOLUTION INTRAMUSCULAR; INTRAVENOUS at 00:26

## 2024-06-22 RX ADMIN — HEPARIN SODIUM 5000 UNIT(S): 1000 INJECTION INTRAVENOUS; SUBCUTANEOUS at 14:00

## 2024-12-03 ENCOUNTER — EMERGENCY (EMERGENCY)
Facility: HOSPITAL | Age: 39
LOS: 1 days | Discharge: HOME | End: 2024-12-03
Attending: STUDENT IN AN ORGANIZED HEALTH CARE EDUCATION/TRAINING PROGRAM
Payer: MEDICAID

## 2024-12-03 VITALS
HEIGHT: 60 IN | RESPIRATION RATE: 18 BRPM | HEART RATE: 93 BPM | TEMPERATURE: 98 F | DIASTOLIC BLOOD PRESSURE: 58 MMHG | OXYGEN SATURATION: 95 % | SYSTOLIC BLOOD PRESSURE: 100 MMHG | WEIGHT: 123.02 LBS

## 2024-12-03 LAB
ALBUMIN SERPL ELPH-MCNC: 3.2 G/DL — LOW (ref 3.5–5)
ALP SERPL-CCNC: 76 U/L — SIGNIFICANT CHANGE UP (ref 40–120)
ALT FLD-CCNC: 27 U/L DA — SIGNIFICANT CHANGE UP (ref 10–60)
ANION GAP SERPL CALC-SCNC: 4 MMOL/L — LOW (ref 5–17)
APPEARANCE UR: CLEAR — SIGNIFICANT CHANGE UP
APTT BLD: 33.8 SEC — SIGNIFICANT CHANGE UP (ref 24.5–35.6)
AST SERPL-CCNC: 15 U/L — SIGNIFICANT CHANGE UP (ref 10–40)
BACTERIA # UR AUTO: ABNORMAL /HPF
BASOPHILS # BLD AUTO: 0.04 K/UL — SIGNIFICANT CHANGE UP (ref 0–0.2)
BASOPHILS NFR BLD AUTO: 0.4 % — SIGNIFICANT CHANGE UP (ref 0–2)
BILIRUB SERPL-MCNC: 0.3 MG/DL — SIGNIFICANT CHANGE UP (ref 0.2–1.2)
BILIRUB UR-MCNC: NEGATIVE — SIGNIFICANT CHANGE UP
BLD GP AB SCN SERPL QL: SIGNIFICANT CHANGE UP
BUN SERPL-MCNC: 10 MG/DL — SIGNIFICANT CHANGE UP (ref 7–18)
CALCIUM SERPL-MCNC: 9.1 MG/DL — SIGNIFICANT CHANGE UP (ref 8.4–10.5)
CHLORIDE SERPL-SCNC: 109 MMOL/L — HIGH (ref 96–108)
CO2 SERPL-SCNC: 25 MMOL/L — SIGNIFICANT CHANGE UP (ref 22–31)
COLOR SPEC: YELLOW — SIGNIFICANT CHANGE UP
CREAT SERPL-MCNC: 0.56 MG/DL — SIGNIFICANT CHANGE UP (ref 0.5–1.3)
DIFF PNL FLD: ABNORMAL
EGFR: 119 ML/MIN/1.73M2 — SIGNIFICANT CHANGE UP
EOSINOPHIL # BLD AUTO: 0.17 K/UL — SIGNIFICANT CHANGE UP (ref 0–0.5)
EOSINOPHIL NFR BLD AUTO: 1.8 % — SIGNIFICANT CHANGE UP (ref 0–6)
EPI CELLS # UR: PRESENT
GLUCOSE SERPL-MCNC: 88 MG/DL — SIGNIFICANT CHANGE UP (ref 70–99)
GLUCOSE UR QL: NEGATIVE MG/DL — SIGNIFICANT CHANGE UP
HCG SERPL-ACNC: 4594 MIU/ML — HIGH
HCT VFR BLD CALC: 35.9 % — SIGNIFICANT CHANGE UP (ref 34.5–45)
HGB BLD-MCNC: 11.3 G/DL — LOW (ref 11.5–15.5)
IMM GRANULOCYTES NFR BLD AUTO: 0.6 % — SIGNIFICANT CHANGE UP (ref 0–0.9)
INR BLD: 1.09 RATIO — SIGNIFICANT CHANGE UP (ref 0.85–1.16)
KETONES UR-MCNC: NEGATIVE MG/DL — SIGNIFICANT CHANGE UP
LEUKOCYTE ESTERASE UR-ACNC: ABNORMAL
LYMPHOCYTES # BLD AUTO: 1.89 K/UL — SIGNIFICANT CHANGE UP (ref 1–3.3)
LYMPHOCYTES # BLD AUTO: 20.4 % — SIGNIFICANT CHANGE UP (ref 13–44)
MCHC RBC-ENTMCNC: 23.8 PG — LOW (ref 27–34)
MCHC RBC-ENTMCNC: 31.5 G/DL — LOW (ref 32–36)
MCV RBC AUTO: 75.7 FL — LOW (ref 80–100)
MONOCYTES # BLD AUTO: 0.58 K/UL — SIGNIFICANT CHANGE UP (ref 0–0.9)
MONOCYTES NFR BLD AUTO: 6.3 % — SIGNIFICANT CHANGE UP (ref 2–14)
NEUTROPHILS # BLD AUTO: 6.51 K/UL — SIGNIFICANT CHANGE UP (ref 1.8–7.4)
NEUTROPHILS NFR BLD AUTO: 70.5 % — SIGNIFICANT CHANGE UP (ref 43–77)
NITRITE UR-MCNC: NEGATIVE — SIGNIFICANT CHANGE UP
NRBC # BLD: 0 /100 WBCS — SIGNIFICANT CHANGE UP (ref 0–0)
PH UR: 6.5 — SIGNIFICANT CHANGE UP (ref 5–8)
PLATELET # BLD AUTO: 419 K/UL — HIGH (ref 150–400)
POTASSIUM SERPL-MCNC: 3.7 MMOL/L — SIGNIFICANT CHANGE UP (ref 3.5–5.3)
POTASSIUM SERPL-SCNC: 3.7 MMOL/L — SIGNIFICANT CHANGE UP (ref 3.5–5.3)
PROT SERPL-MCNC: 8.1 G/DL — SIGNIFICANT CHANGE UP (ref 6–8.3)
PROT UR-MCNC: NEGATIVE MG/DL — SIGNIFICANT CHANGE UP
PROTHROM AB SERPL-ACNC: 12.7 SEC — SIGNIFICANT CHANGE UP (ref 9.9–13.4)
RBC # BLD: 4.74 M/UL — SIGNIFICANT CHANGE UP (ref 3.8–5.2)
RBC # FLD: 13.7 % — SIGNIFICANT CHANGE UP (ref 10.3–14.5)
RBC CASTS # UR COMP ASSIST: 3 /HPF — SIGNIFICANT CHANGE UP (ref 0–4)
SODIUM SERPL-SCNC: 138 MMOL/L — SIGNIFICANT CHANGE UP (ref 135–145)
SP GR SPEC: 1.01 — SIGNIFICANT CHANGE UP (ref 1–1.03)
UROBILINOGEN FLD QL: 0.2 MG/DL — SIGNIFICANT CHANGE UP (ref 0.2–1)
WBC # BLD: 9.25 K/UL — SIGNIFICANT CHANGE UP (ref 3.8–10.5)
WBC # FLD AUTO: 9.25 K/UL — SIGNIFICANT CHANGE UP (ref 3.8–10.5)
WBC UR QL: 1 /HPF — SIGNIFICANT CHANGE UP (ref 0–5)

## 2024-12-03 PROCEDURE — 80053 COMPREHEN METABOLIC PANEL: CPT

## 2024-12-03 PROCEDURE — 85610 PROTHROMBIN TIME: CPT

## 2024-12-03 PROCEDURE — 99284 EMERGENCY DEPT VISIT MOD MDM: CPT

## 2024-12-03 PROCEDURE — 36415 COLL VENOUS BLD VENIPUNCTURE: CPT

## 2024-12-03 PROCEDURE — 76817 TRANSVAGINAL US OBSTETRIC: CPT

## 2024-12-03 PROCEDURE — 81001 URINALYSIS AUTO W/SCOPE: CPT

## 2024-12-03 PROCEDURE — 76815 OB US LIMITED FETUS(S): CPT | Mod: 26

## 2024-12-03 PROCEDURE — 99284 EMERGENCY DEPT VISIT MOD MDM: CPT | Mod: 25

## 2024-12-03 PROCEDURE — 76802 OB US < 14 WKS ADDL FETUS: CPT

## 2024-12-03 PROCEDURE — 86850 RBC ANTIBODY SCREEN: CPT

## 2024-12-03 PROCEDURE — 85730 THROMBOPLASTIN TIME PARTIAL: CPT

## 2024-12-03 PROCEDURE — 84702 CHORIONIC GONADOTROPIN TEST: CPT

## 2024-12-03 PROCEDURE — 85025 COMPLETE CBC W/AUTO DIFF WBC: CPT

## 2024-12-03 PROCEDURE — 76817 TRANSVAGINAL US OBSTETRIC: CPT | Mod: 26

## 2024-12-03 PROCEDURE — 86900 BLOOD TYPING SEROLOGIC ABO: CPT

## 2024-12-03 PROCEDURE — 86901 BLOOD TYPING SEROLOGIC RH(D): CPT

## 2024-12-03 RX ORDER — ACETAMINOPHEN 500MG 500 MG/1
650 TABLET, COATED ORAL ONCE
Refills: 0 | Status: COMPLETED | OUTPATIENT
Start: 2024-12-03 | End: 2024-12-03

## 2024-12-03 RX ADMIN — ACETAMINOPHEN 500MG 650 MILLIGRAM(S): 500 TABLET, COATED ORAL at 17:19

## 2024-12-03 RX ADMIN — ACETAMINOPHEN 500MG 650 MILLIGRAM(S): 500 TABLET, COATED ORAL at 16:19

## 2024-12-03 NOTE — ED PROVIDER NOTE - CLINICAL SUMMARY MEDICAL DECISION MAKING FREE TEXT BOX
Patient is a 39-year-old female G3, P2 approximately 8 weeks pregnant presenting with vaginal bleeding and lower abdominal discomfort.   VSS belly soft no peritoneal signs.  -   Will check labs, rule out electrolyte abnormalities and blood loss anemia, urinalysis to rule out urinary tract infection, ultrasound OB to evaluate for miscarriage versus ectopic versus pregnancy complication, reassess.

## 2024-12-03 NOTE — ED PROVIDER NOTE - OBJECTIVE STATEMENT
Patient is a 39-year-old female G3, P2 approximately 8 weeks pregnant presenting with vaginal bleeding and lower abdominal discomfort.   Patient states she developed some light vaginal bleeding as well as lower abdominal discomfort this morning.  Denies any falls, blood thinners, syncope. Patient has had 2 c-sections in the past. Denies any other past history, allergies surgeries.  Patient followed up with her OB last week and received an ultrasound and was told everything was within normal limits.

## 2024-12-03 NOTE — ED PROVIDER NOTE - PROGRESS NOTE DETAILS
JK - Labs WNL. U/S showing Intrauterine pregnancy of uncertain viability. No yolk sac and no fetal   heart rate seen with small crown-rump length. Continued close obstetrical   and sonographic follow-up is advised.   All results discussed with patient at length.  Vital signs stable belly soft nontender tolerating p.o.  Instructed to follow-up with OB  or return to the emergency department in 48 hours for repeat imaging and hCG.   Antibiotic prescription sent to pharmacy for leuks in urine.  Demonstrating full understanding of findings.  Ready for discharge with outpatient follow-up.

## 2024-12-03 NOTE — ED PROVIDER NOTE - NSFOLLOWUPCLINICS_GEN_ALL_ED_FT
[facility]  [facility]  [address] [place] [address]  Phone: [phone]  Fax: [phone]  Follow Up Time: Routine

## 2024-12-03 NOTE — ED ADULT NURSE NOTE - OBJECTIVE STATEMENT
AOX4 +ambulatory patient reports 8 weeks pregnant complaining of abdominal pain and vag bleed. No fevers or chills

## 2024-12-03 NOTE — ED PROVIDER NOTE - PHYSICAL EXAMINATION
Gen: no acute distress  Head: normocephalic, atraumatic  Lung: CTAB, no respiratory distress, no wheezing, rales, rhonchi  CV: normal s1/s2, rrr,   Abd: soft, non-tender, non-distended, no peritoneal signs   MSK: No edema, no visible deformities, full range of motion in all 4 extremities  Neuro: No focal neurologic deficits

## 2024-12-03 NOTE — ED PROVIDER NOTE - PATIENT PORTAL LINK FT
You can access the FollowMyHealth Patient Portal offered by [facility] by registering at the following website: [email or web address]. By joining [employer]’s FollowMyHealth portal, you will also be able to view your health information using other applications (apps) compatible with our system.

## 2024-12-06 ENCOUNTER — EMERGENCY (EMERGENCY)
Facility: HOSPITAL | Age: 39
LOS: 1 days | Discharge: HOME | End: 2024-12-06
Attending: STUDENT IN AN ORGANIZED HEALTH CARE EDUCATION/TRAINING PROGRAM
Payer: MEDICAID

## 2024-12-06 VITALS
TEMPERATURE: 99 F | OXYGEN SATURATION: 99 % | SYSTOLIC BLOOD PRESSURE: 104 MMHG | WEIGHT: 123.46 LBS | HEART RATE: 87 BPM | RESPIRATION RATE: 14 BRPM | DIASTOLIC BLOOD PRESSURE: 68 MMHG | HEIGHT: 60 IN

## 2024-12-06 LAB
ALBUMIN SERPL ELPH-MCNC: 2.9 G/DL — LOW (ref 3.5–5)
ALP SERPL-CCNC: 79 U/L — SIGNIFICANT CHANGE UP (ref 40–120)
ALT FLD-CCNC: 20 U/L DA — SIGNIFICANT CHANGE UP (ref 10–60)
ANION GAP SERPL CALC-SCNC: 6 MMOL/L — SIGNIFICANT CHANGE UP (ref 5–17)
AST SERPL-CCNC: 20 U/L — SIGNIFICANT CHANGE UP (ref 10–40)
BASOPHILS # BLD AUTO: 0.04 K/UL — SIGNIFICANT CHANGE UP (ref 0–0.2)
BASOPHILS NFR BLD AUTO: 0.5 % — SIGNIFICANT CHANGE UP (ref 0–2)
BILIRUB SERPL-MCNC: 0.3 MG/DL — SIGNIFICANT CHANGE UP (ref 0.2–1.2)
BUN SERPL-MCNC: 12 MG/DL — SIGNIFICANT CHANGE UP (ref 7–18)
CALCIUM SERPL-MCNC: 9 MG/DL — SIGNIFICANT CHANGE UP (ref 8.4–10.5)
CHLORIDE SERPL-SCNC: 110 MMOL/L — HIGH (ref 96–108)
CO2 SERPL-SCNC: 23 MMOL/L — SIGNIFICANT CHANGE UP (ref 22–31)
CREAT SERPL-MCNC: 0.57 MG/DL — SIGNIFICANT CHANGE UP (ref 0.5–1.3)
EGFR: 118 ML/MIN/1.73M2 — SIGNIFICANT CHANGE UP
EOSINOPHIL # BLD AUTO: 0.25 K/UL — SIGNIFICANT CHANGE UP (ref 0–0.5)
EOSINOPHIL NFR BLD AUTO: 2.9 % — SIGNIFICANT CHANGE UP (ref 0–6)
GLUCOSE SERPL-MCNC: 100 MG/DL — HIGH (ref 70–99)
HCG SERPL-ACNC: 529 MIU/ML — HIGH
HCT VFR BLD CALC: 34.5 % — SIGNIFICANT CHANGE UP (ref 34.5–45)
HGB BLD-MCNC: 11 G/DL — LOW (ref 11.5–15.5)
IMM GRANULOCYTES NFR BLD AUTO: 0.2 % — SIGNIFICANT CHANGE UP (ref 0–0.9)
LYMPHOCYTES # BLD AUTO: 1.74 K/UL — SIGNIFICANT CHANGE UP (ref 1–3.3)
LYMPHOCYTES # BLD AUTO: 20.4 % — SIGNIFICANT CHANGE UP (ref 13–44)
MCHC RBC-ENTMCNC: 24.2 PG — LOW (ref 27–34)
MCHC RBC-ENTMCNC: 31.9 G/DL — LOW (ref 32–36)
MCV RBC AUTO: 76 FL — LOW (ref 80–100)
MONOCYTES # BLD AUTO: 0.52 K/UL — SIGNIFICANT CHANGE UP (ref 0–0.9)
MONOCYTES NFR BLD AUTO: 6.1 % — SIGNIFICANT CHANGE UP (ref 2–14)
NEUTROPHILS # BLD AUTO: 5.96 K/UL — SIGNIFICANT CHANGE UP (ref 1.8–7.4)
NEUTROPHILS NFR BLD AUTO: 69.9 % — SIGNIFICANT CHANGE UP (ref 43–77)
NRBC # BLD: 0 /100 WBCS — SIGNIFICANT CHANGE UP (ref 0–0)
PLATELET # BLD AUTO: 391 K/UL — SIGNIFICANT CHANGE UP (ref 150–400)
POTASSIUM SERPL-MCNC: 4.1 MMOL/L — SIGNIFICANT CHANGE UP (ref 3.5–5.3)
POTASSIUM SERPL-SCNC: 4.1 MMOL/L — SIGNIFICANT CHANGE UP (ref 3.5–5.3)
PROT SERPL-MCNC: 7.7 G/DL — SIGNIFICANT CHANGE UP (ref 6–8.3)
RBC # BLD: 4.54 M/UL — SIGNIFICANT CHANGE UP (ref 3.8–5.2)
RBC # FLD: 13.8 % — SIGNIFICANT CHANGE UP (ref 10.3–14.5)
SODIUM SERPL-SCNC: 139 MMOL/L — SIGNIFICANT CHANGE UP (ref 135–145)
WBC # BLD: 8.53 K/UL — SIGNIFICANT CHANGE UP (ref 3.8–10.5)
WBC # FLD AUTO: 8.53 K/UL — SIGNIFICANT CHANGE UP (ref 3.8–10.5)

## 2024-12-06 PROCEDURE — 36415 COLL VENOUS BLD VENIPUNCTURE: CPT

## 2024-12-06 PROCEDURE — 85025 COMPLETE CBC W/AUTO DIFF WBC: CPT

## 2024-12-06 PROCEDURE — 76815 OB US LIMITED FETUS(S): CPT | Mod: 26

## 2024-12-06 PROCEDURE — 76802 OB US < 14 WKS ADDL FETUS: CPT

## 2024-12-06 PROCEDURE — 76817 TRANSVAGINAL US OBSTETRIC: CPT

## 2024-12-06 PROCEDURE — 99284 EMERGENCY DEPT VISIT MOD MDM: CPT

## 2024-12-06 PROCEDURE — 80053 COMPREHEN METABOLIC PANEL: CPT

## 2024-12-06 PROCEDURE — 76817 TRANSVAGINAL US OBSTETRIC: CPT | Mod: 26

## 2024-12-06 PROCEDURE — 84702 CHORIONIC GONADOTROPIN TEST: CPT

## 2024-12-06 PROCEDURE — 99284 EMERGENCY DEPT VISIT MOD MDM: CPT | Mod: 25

## 2024-12-06 NOTE — ED ADULT NURSE NOTE - NSFALLUNIVINTERV_ED_ALL_ED
Bed/Stretcher in lowest position, wheels locked, appropriate side rails in place/Call bell, personal items and telephone in reach/Instruct patient to call for assistance before getting out of bed/chair/stretcher/Non-slip footwear applied when patient is off stretcher/Deposit to call system/Physically safe environment - no spills, clutter or unnecessary equipment/Purposeful proactive rounding/Room/bathroom lighting operational, light cord in reach

## 2024-12-06 NOTE — ED PROVIDER NOTE - PHYSICAL EXAMINATION
Gen: no acute distress  Head: normocephalic, atraumatic  Lung: CTAB, no respiratory distress, no wheezing, rales, rhonchi  CV: normal s1/s2, rrr,   Abd: soft, non-tender, non-distended  MSK: full range of motion in all 4 extremities  Neuro: No focal neurologic deficits

## 2024-12-06 NOTE — ED PROVIDER NOTE - OBJECTIVE STATEMENT
Patient is a 39-year-old female  approximately 8 weeks pregnant presenting with vaginal bleeding.  Patient was seen for the same on 12/3, labs were WNL, U/S showing Intrauterine pregnancy of uncertain viability. No yolk sac and no fetal heart rate seen with small crown-rump length.  patient presents for repeat hCG and ultrasound.  States her vaginal bleeding has slowed down, denies any clots, trauma, blood thinners.  Patient was also prescribed antibiotics for asymptomatic bacteriuria which she has been taking.  Denies any fevers, nausea vomiting, abdominal pain. HCG [id number] at the time.

## 2024-12-06 NOTE — ED PROVIDER NOTE - PROGRESS NOTE DETAILS
JK - hCG downtrending to 500s.   Labs otherwise within normal limits.  Vital signs stable rest comfortably, belly soft nontender, tolerating p.o.  Asymptomatic.  Ultrasound showing likely miscarriage, gestational sac no longer visualized.  Cyst discussed with patient at length.  Ready for discharge with outpatient routine OB/GYN follow-up.

## 2024-12-06 NOTE — ED PROVIDER NOTE - PATIENT PORTAL LINK FT
You can access the FollowMyHealth Patient Portal offered by [facility] by registering at the following website: [email or web address]. By joining [employer]’s FollowMyHealth portal, you will also be able to view your health information using other applications (apps) compatible with our system.

## 2024-12-06 NOTE — ED ADULT NURSE NOTE - NS ED NOTE ABUSE RESPONSE YN
Yes Detail Level: Generalized Detail Level: Detailed Detail Level: Zone Patient Specific Counseling (Will Not Stick From Patient To Patient): Continue to lose weight. Not interested in humira at this time . Extensive surgery also not recommended at this time Detail Level: Simple

## 2024-12-06 NOTE — ED PROVIDER NOTE - NSFOLLOWUPCLINICS_GEN_ALL_ED_FT
[facility]  [facility]  [address] [place] [address]  Phone: [phone]  Fax: [phone]  Follow Up Time: Routine

## 2024-12-06 NOTE — ED PROVIDER NOTE - NSFOLLOWUPINSTRUCTIONS_ED_ALL_ED_FT
Miscarriage  A miscarriage is the loss of an unborn baby before the 20th week of pregnancy. Most miscarriages occur in the first 3 months of pregnancy. A miscarriage may happen before a woman knows that they're pregnant.    If you lose a pregnancy, talk with your health care provider about:  Any questions you have about the loss of your baby.  How to work through your grief.  Plans for future pregnancy.  What are the causes?  Many times, the cause of this condition is not known.    What increases the risk?  Certain medical conditions    Conditions that affect hormones, such as:  Thyroid problems.  Polycystic ovary syndrome.  Diabetes.  Autoimmune disorders.  Infections.  Bleeding problems.  Obesity.  Lifestyle factors    Smoking, vaping, or use of other products with tobacco or nicotine in them.  Being around people who smoke.  Drinking alcohol or taking certain substances.  Having large amounts of caffeine.  Problems with the body    Scars in the uterus.  Growths, such as fibroids, in the uterus.  Problems in the body that are present at birth.  Infection.  A cervix that opens and thins before your due date.  Personal or medical history    Having had a miscarriage before.  Being younger than age 18 or older than age 35 when you become pregnant.  Being around a harmful things, such as radiation.  Having lead or other heavy metals where you live or work.  Use of some medicines.  What are the signs or symptoms?  Symptoms of this condition include:  Blood or spots of blood coming from the vagina.  Pain or cramps in the belly or low back.  Fluid or tissue coming out of the vagina.  How is this diagnosed?  This condition may be diagnosed based on:  A physical exam.  Ultrasound.  Lab tests, such as blood tests or urine tests.  How is this treated?  Treatment is not needed if all the pregnancy tissue came out of your uterus.    If you need treatment, you may be treated with:  Dilation and curettage (D&C). This removes the remaining tissue from the uterus.  Medicines. These may be given to help your body remove the last of the tissue.  Antibiotics.  Follow these instructions at home:  Medicines    Take your medicines only as told.  If you were given antibiotics, take them as told. Do not stop taking them even if you start to feel better.  Activity    Rest as told by your provider. Ask your provider what activities are safe for you.  If able, have someone help with home and family duties during this time.  General instructions    Two people talking with a counselor.  Watch how much tissue comes out of the vagina.  Watch the size of any blood clots that come out of the vagina.  Do not have sex or douche until your provider says it is okay.  Do not put things, such as tampons, in your vagina until your provider says it is okay.  To help you and your partner with grieving:  Talk with your provider.  See a counselor.  When you are ready, talk with your provider about:  Things to do for your health.  How you can be healthy if you get pregnant again.  Where to find more information  The American College of Obstetricians and Gynecologists: acog.org  [place] Department of Health and Human Services Office of Women's Health: hrsa.gov/office-womens-health  Contact a health care provider if:  You have a fever or chills.  There's bad-smelling fluid coming from your vagina.  You have more bleeding instead of less.  More tissue or blood clots come out of your vagina than you were told to expect.  You become light-headed or weak.  Get help right away if:  Heavy bleeding soaks through 2 large pads an hour for more than 2 hours.  You faint.  You feel sad all the time.  You think about hurting yourself.  These symptoms may be an emergency. Take one of these steps right away:  Go to your nearest emergency room.  Call 470.  Call the Suicide & Crisis Lifeline (free and confidential):  Call 1-576.174.8894 or 170.  Text 641085.  This information is not intended to replace advice given to you by your health care provider. Make sure you discuss any questions you have with your health care provider.

## 2024-12-06 NOTE — ED PROVIDER NOTE - CLINICAL SUMMARY MEDICAL DECISION MAKING FREE TEXT BOX
Patient is a 39-year-old female  approximately 8 weeks pregnant presenting with vaginal bleeding.  Patient was seen for the same on 12/3, labs were WNL, U/S showing Intrauterine pregnancy of uncertain viability. No yolk sac and no fetal heart rate seen with small crown-rump length. HCG 4594 at the time. VSS belly soft no peritoneal signs.   -   Will check labs to rule out blood loss anemia and electrolyte abnormalities, trend hCG, repeat ultrasound to evaluate for ectopic versus miscarriage, reassess.

## 2024-12-18 NOTE — PATIENT PROFILE OB - FUNCTIONAL ASSESSMENT - DAILY ACTIVITY 3.
Physical Therapy Daily Treatment Note  Date:  2024    TIme In: 1436                      Time Out:1531    Patient Name:  Kristyn Martin    :  1955  MRN: [id number]    Restrictions/Precautions:    Pertinent Medical History:  Medical/Treatment Diagnosis Information:  Low back pain, unspecified [M54.50]     Insurance/Certification information:  Payor: [employer]ARE / Plan: [facility] MEDICARE ADVANTAGE [place] / Product Type: *No Product type* /   Physician Information:  Sera Valadez MD  Plan of care signed (Y/N):    Visit# / total visits:     10 /    G-Code (if applicable):      Date / Visit # G-Code Applied:         Progress Note: []  Yes  [x]  No  Next due by: Visit #10       Pain level:   5/10 left foot  Subjective: Patient states that her back is still doing so much better.  But now she is having pain in her left foot and her Dr has ordered physical therapy for achilles tendonitis and plantar fascitis and she wants to start that soon.  She states that she is doing her HEP daily and using her cold pack daily.  She states that her right knee is feeling better also.     Objective:  Observation:   Test measurements:      Exercises:  Exercise Resistance/Repetitions Date performed   1: Sci fit Level 3, 7 min 18   2: Bent knee fall out   3x10 B 18   3: PPT  3x10 18   4: Hip add w/ ball   3x10 18   5: Clam shells  3x10 18   6: Mid rows with posture focus  3x10, GTB  18   7. SKTC  5x10\" 18   8. Pullies, flex 3 min 18   9. Standing marching BLE 15 18   10. Standing hip ABD BLE 15 18   11. Standing heel raises BLE 15 18          Other Therapeutic Activities:      Manual Treatments:  Rockblade, and STM to lumbar area 10 min    Modalities:  cold pack to lumbar area, 15 min       Timed Code Treatment Minutes:  40      Total Treatment Minutes: 55    Patient Goal(s):    Short Term Goals Completed by 4 weeks Goal Status   Patient to be IND with HEP Met   Pt to demonstrate B hip abd strength of 4-/5 or greater. Met  4 = No assist / stand by assistance
